# Patient Record
Sex: FEMALE | Race: WHITE | NOT HISPANIC OR LATINO | Employment: OTHER | URBAN - METROPOLITAN AREA
[De-identification: names, ages, dates, MRNs, and addresses within clinical notes are randomized per-mention and may not be internally consistent; named-entity substitution may affect disease eponyms.]

---

## 2024-07-16 ENCOUNTER — APPOINTMENT (OUTPATIENT)
Dept: RADIOLOGY | Facility: HOSPITAL | Age: 74
End: 2024-07-16
Payer: MEDICARE

## 2024-07-16 ENCOUNTER — HOSPITAL ENCOUNTER (OUTPATIENT)
Facility: HOSPITAL | Age: 74
Setting detail: OBSERVATION
Discharge: HOME | End: 2024-07-17
Attending: EMERGENCY MEDICINE | Admitting: STUDENT IN AN ORGANIZED HEALTH CARE EDUCATION/TRAINING PROGRAM
Payer: MEDICARE

## 2024-07-16 ENCOUNTER — APPOINTMENT (OUTPATIENT)
Dept: CARDIOLOGY | Facility: HOSPITAL | Age: 74
End: 2024-07-16
Payer: MEDICARE

## 2024-07-16 DIAGNOSIS — N13.30 HYDRONEPHROSIS, LEFT: ICD-10-CM

## 2024-07-16 DIAGNOSIS — N13.4 HYDROURETER ON LEFT: ICD-10-CM

## 2024-07-16 DIAGNOSIS — N13.30 HYDRONEPHROSIS, UNSPECIFIED HYDRONEPHROSIS TYPE: ICD-10-CM

## 2024-07-16 DIAGNOSIS — N30.01 ACUTE CYSTITIS WITH HEMATURIA: Primary | ICD-10-CM

## 2024-07-16 PROBLEM — R10.9 ABDOMINAL PAIN, UNSPECIFIED ABDOMINAL LOCATION: Status: ACTIVE | Noted: 2024-07-16

## 2024-07-16 PROBLEM — K86.2 PANCREATIC CYST (HHS-HCC): Status: ACTIVE | Noted: 2024-07-16

## 2024-07-16 PROBLEM — N13.39 OTHER HYDRONEPHROSIS: Status: ACTIVE | Noted: 2024-07-16

## 2024-07-16 LAB
ALBUMIN SERPL BCP-MCNC: 4 G/DL (ref 3.4–5)
ALP SERPL-CCNC: 48 U/L (ref 33–136)
ALT SERPL W P-5'-P-CCNC: 24 U/L (ref 7–45)
ANION GAP SERPL CALC-SCNC: 16 MMOL/L (ref 10–20)
APPEARANCE UR: CLEAR
AST SERPL W P-5'-P-CCNC: 29 U/L (ref 9–39)
ATRIAL RATE: 89 BPM
BACTERIA #/AREA URNS AUTO: ABNORMAL /HPF
BASOPHILS # BLD AUTO: 0.02 X10*3/UL (ref 0–0.1)
BASOPHILS NFR BLD AUTO: 0.2 %
BILIRUB SERPL-MCNC: 0.7 MG/DL (ref 0–1.2)
BILIRUB UR STRIP.AUTO-MCNC: NEGATIVE MG/DL
BUN SERPL-MCNC: 24 MG/DL (ref 6–23)
CALCIUM SERPL-MCNC: 8.9 MG/DL (ref 8.6–10.3)
CHLORIDE SERPL-SCNC: 101 MMOL/L (ref 98–107)
CO2 SERPL-SCNC: 23 MMOL/L (ref 21–32)
COLOR UR: ABNORMAL
CREAT SERPL-MCNC: 0.91 MG/DL (ref 0.5–1.05)
EGFRCR SERPLBLD CKD-EPI 2021: 67 ML/MIN/1.73M*2
EOSINOPHIL # BLD AUTO: 0 X10*3/UL (ref 0–0.4)
EOSINOPHIL NFR BLD AUTO: 0 %
ERYTHROCYTE [DISTWIDTH] IN BLOOD BY AUTOMATED COUNT: 12.1 % (ref 11.5–14.5)
FLUAV RNA RESP QL NAA+PROBE: NOT DETECTED
FLUBV RNA RESP QL NAA+PROBE: NOT DETECTED
GLUCOSE SERPL-MCNC: 156 MG/DL (ref 74–99)
GLUCOSE UR STRIP.AUTO-MCNC: NORMAL MG/DL
HCT VFR BLD AUTO: 37.8 % (ref 36–46)
HGB BLD-MCNC: 12.5 G/DL (ref 12–16)
HOLD SPECIMEN: NORMAL
HOLD SPECIMEN: NORMAL
IMM GRANULOCYTES # BLD AUTO: 0.03 X10*3/UL (ref 0–0.5)
IMM GRANULOCYTES NFR BLD AUTO: 0.3 % (ref 0–0.9)
KETONES UR STRIP.AUTO-MCNC: ABNORMAL MG/DL
LACTATE SERPL-SCNC: 1 MMOL/L (ref 0.4–2)
LEUKOCYTE ESTERASE UR QL STRIP.AUTO: ABNORMAL
LYMPHOCYTES # BLD AUTO: 0.19 X10*3/UL (ref 0.8–3)
LYMPHOCYTES NFR BLD AUTO: 2 %
MAGNESIUM SERPL-MCNC: 1.68 MG/DL (ref 1.6–2.4)
MCH RBC QN AUTO: 28.9 PG (ref 26–34)
MCHC RBC AUTO-ENTMCNC: 33.1 G/DL (ref 32–36)
MCV RBC AUTO: 88 FL (ref 80–100)
MONOCYTES # BLD AUTO: 0.63 X10*3/UL (ref 0.05–0.8)
MONOCYTES NFR BLD AUTO: 6.7 %
MUCOUS THREADS #/AREA URNS AUTO: ABNORMAL /LPF
NEUTROPHILS # BLD AUTO: 8.48 X10*3/UL (ref 1.6–5.5)
NEUTROPHILS NFR BLD AUTO: 90.8 %
NITRITE UR QL STRIP.AUTO: NEGATIVE
NRBC BLD-RTO: 0 /100 WBCS (ref 0–0)
P AXIS: 55 DEGREES
P OFFSET: 200 MS
P ONSET: 151 MS
PH UR STRIP.AUTO: 6.5 [PH]
PHOSPHATE SERPL-MCNC: 1.7 MG/DL (ref 2.5–4.9)
PLATELET # BLD AUTO: 122 X10*3/UL (ref 150–450)
POTASSIUM SERPL-SCNC: 3.5 MMOL/L (ref 3.5–5.3)
PR INTERVAL: 150 MS
PROT SERPL-MCNC: 6.7 G/DL (ref 6.4–8.2)
PROT UR STRIP.AUTO-MCNC: ABNORMAL MG/DL
Q ONSET: 226 MS
QRS COUNT: 15 BEATS
QRS DURATION: 84 MS
QT INTERVAL: 368 MS
QTC CALCULATION(BAZETT): 447 MS
QTC FREDERICIA: 419 MS
R AXIS: 41 DEGREES
RBC # BLD AUTO: 4.32 X10*6/UL (ref 4–5.2)
RBC # UR STRIP.AUTO: ABNORMAL /UL
RBC #/AREA URNS AUTO: >20 /HPF
SARS-COV-2 RNA RESP QL NAA+PROBE: NOT DETECTED
SODIUM SERPL-SCNC: 136 MMOL/L (ref 136–145)
SP GR UR STRIP.AUTO: 1.02
T AXIS: 2 DEGREES
T OFFSET: 410 MS
UROBILINOGEN UR STRIP.AUTO-MCNC: NORMAL MG/DL
VENTRICULAR RATE: 89 BPM
WBC # BLD AUTO: 9.4 X10*3/UL (ref 4.4–11.3)
WBC #/AREA URNS AUTO: ABNORMAL /HPF

## 2024-07-16 PROCEDURE — 2500000001 HC RX 250 WO HCPCS SELF ADMINISTERED DRUGS (ALT 637 FOR MEDICARE OP)

## 2024-07-16 PROCEDURE — 80053 COMPREHEN METABOLIC PANEL: CPT

## 2024-07-16 PROCEDURE — 36415 COLL VENOUS BLD VENIPUNCTURE: CPT

## 2024-07-16 PROCEDURE — 81001 URINALYSIS AUTO W/SCOPE: CPT

## 2024-07-16 PROCEDURE — 83735 ASSAY OF MAGNESIUM: CPT

## 2024-07-16 PROCEDURE — 96372 THER/PROPH/DIAG INJ SC/IM: CPT | Performed by: STUDENT IN AN ORGANIZED HEALTH CARE EDUCATION/TRAINING PROGRAM

## 2024-07-16 PROCEDURE — 84100 ASSAY OF PHOSPHORUS: CPT

## 2024-07-16 PROCEDURE — 96375 TX/PRO/DX INJ NEW DRUG ADDON: CPT

## 2024-07-16 PROCEDURE — 83605 ASSAY OF LACTIC ACID: CPT

## 2024-07-16 PROCEDURE — 2500000001 HC RX 250 WO HCPCS SELF ADMINISTERED DRUGS (ALT 637 FOR MEDICARE OP): Performed by: EMERGENCY MEDICINE

## 2024-07-16 PROCEDURE — 2500000004 HC RX 250 GENERAL PHARMACY W/ HCPCS (ALT 636 FOR OP/ED): Performed by: STUDENT IN AN ORGANIZED HEALTH CARE EDUCATION/TRAINING PROGRAM

## 2024-07-16 PROCEDURE — 2500000001 HC RX 250 WO HCPCS SELF ADMINISTERED DRUGS (ALT 637 FOR MEDICARE OP): Performed by: STUDENT IN AN ORGANIZED HEALTH CARE EDUCATION/TRAINING PROGRAM

## 2024-07-16 PROCEDURE — 99223 1ST HOSP IP/OBS HIGH 75: CPT | Performed by: STUDENT IN AN ORGANIZED HEALTH CARE EDUCATION/TRAINING PROGRAM

## 2024-07-16 PROCEDURE — 96361 HYDRATE IV INFUSION ADD-ON: CPT

## 2024-07-16 PROCEDURE — 85025 COMPLETE CBC W/AUTO DIFF WBC: CPT

## 2024-07-16 PROCEDURE — 74018 RADEX ABDOMEN 1 VIEW: CPT | Performed by: RADIOLOGY

## 2024-07-16 PROCEDURE — 87086 URINE CULTURE/COLONY COUNT: CPT | Mod: STJLAB

## 2024-07-16 PROCEDURE — 71045 X-RAY EXAM CHEST 1 VIEW: CPT | Performed by: RADIOLOGY

## 2024-07-16 PROCEDURE — 96365 THER/PROPH/DIAG IV INF INIT: CPT | Mod: 59

## 2024-07-16 PROCEDURE — 2500000004 HC RX 250 GENERAL PHARMACY W/ HCPCS (ALT 636 FOR OP/ED)

## 2024-07-16 PROCEDURE — 74018 RADEX ABDOMEN 1 VIEW: CPT

## 2024-07-16 PROCEDURE — G0378 HOSPITAL OBSERVATION PER HR: HCPCS

## 2024-07-16 PROCEDURE — 2550000001 HC RX 255 CONTRASTS: Performed by: EMERGENCY MEDICINE

## 2024-07-16 PROCEDURE — 99285 EMERGENCY DEPT VISIT HI MDM: CPT | Mod: 25

## 2024-07-16 PROCEDURE — 87636 SARSCOV2 & INF A&B AMP PRB: CPT

## 2024-07-16 PROCEDURE — 93005 ELECTROCARDIOGRAM TRACING: CPT

## 2024-07-16 PROCEDURE — 74177 CT ABD & PELVIS W/CONTRAST: CPT

## 2024-07-16 PROCEDURE — 71045 X-RAY EXAM CHEST 1 VIEW: CPT

## 2024-07-16 RX ORDER — ATORVASTATIN CALCIUM 20 MG/1
20 TABLET, FILM COATED ORAL DAILY
COMMUNITY

## 2024-07-16 RX ORDER — CEFTRIAXONE 1 G/50ML
1 INJECTION, SOLUTION INTRAVENOUS EVERY 24 HOURS
Status: DISCONTINUED | OUTPATIENT
Start: 2024-07-17 | End: 2024-07-17 | Stop reason: HOSPADM

## 2024-07-16 RX ORDER — CHOLECALCIFEROL (VITAMIN D3) 50 MCG
50 TABLET ORAL DAILY
COMMUNITY

## 2024-07-16 RX ORDER — ATORVASTATIN CALCIUM 20 MG/1
20 TABLET, FILM COATED ORAL DAILY
Status: DISCONTINUED | OUTPATIENT
Start: 2024-07-16 | End: 2024-07-17 | Stop reason: HOSPADM

## 2024-07-16 RX ORDER — ACETAMINOPHEN 325 MG/1
650 TABLET ORAL EVERY 4 HOURS PRN
Status: DISCONTINUED | OUTPATIENT
Start: 2024-07-16 | End: 2024-07-17 | Stop reason: HOSPADM

## 2024-07-16 RX ORDER — ACETAMINOPHEN 160 MG/5ML
650 SOLUTION ORAL EVERY 4 HOURS PRN
Status: DISCONTINUED | OUTPATIENT
Start: 2024-07-16 | End: 2024-07-17 | Stop reason: HOSPADM

## 2024-07-16 RX ORDER — BUTYROSPERMUM PARKII(SHEA BUTTER), SIMMONDSIA CHINENSIS (JOJOBA) SEED OIL, ALOE BARBADENSIS LEAF EXTRACT .01; 1; 3.5 G/100G; G/100G; G/100G
250 LIQUID TOPICAL DAILY
COMMUNITY

## 2024-07-16 RX ORDER — ACETAMINOPHEN 650 MG/1
650 SUPPOSITORY RECTAL EVERY 4 HOURS PRN
Status: DISCONTINUED | OUTPATIENT
Start: 2024-07-16 | End: 2024-07-17 | Stop reason: HOSPADM

## 2024-07-16 RX ORDER — POLYETHYLENE GLYCOL 3350 17 G/17G
17 POWDER, FOR SOLUTION ORAL DAILY
Status: DISCONTINUED | OUTPATIENT
Start: 2024-07-16 | End: 2024-07-17 | Stop reason: HOSPADM

## 2024-07-16 RX ORDER — ACETAMINOPHEN 325 MG/1
975 TABLET ORAL ONCE
Status: COMPLETED | OUTPATIENT
Start: 2024-07-16 | End: 2024-07-16

## 2024-07-16 RX ORDER — KETOROLAC TROMETHAMINE 15 MG/ML
15 INJECTION, SOLUTION INTRAMUSCULAR; INTRAVENOUS ONCE
Status: COMPLETED | OUTPATIENT
Start: 2024-07-16 | End: 2024-07-16

## 2024-07-16 RX ORDER — CEFTRIAXONE 1 G/50ML
1 INJECTION, SOLUTION INTRAVENOUS ONCE
Status: COMPLETED | OUTPATIENT
Start: 2024-07-16 | End: 2024-07-16

## 2024-07-16 RX ORDER — ONDANSETRON HYDROCHLORIDE 2 MG/ML
4 INJECTION, SOLUTION INTRAVENOUS ONCE
Status: COMPLETED | OUTPATIENT
Start: 2024-07-16 | End: 2024-07-16

## 2024-07-16 RX ORDER — ENOXAPARIN SODIUM 100 MG/ML
40 INJECTION SUBCUTANEOUS EVERY 24 HOURS
Status: DISCONTINUED | OUTPATIENT
Start: 2024-07-16 | End: 2024-07-17 | Stop reason: HOSPADM

## 2024-07-16 RX ORDER — PHENAZOPYRIDINE HYDROCHLORIDE 100 MG/1
100 TABLET, FILM COATED ORAL
Status: DISCONTINUED | OUTPATIENT
Start: 2024-07-16 | End: 2024-07-17 | Stop reason: HOSPADM

## 2024-07-16 RX ORDER — CALCIUM CARBONATE 300MG(750)
400 TABLET,CHEWABLE ORAL DAILY
COMMUNITY

## 2024-07-16 RX ORDER — ONDANSETRON HYDROCHLORIDE 2 MG/ML
INJECTION, SOLUTION INTRAVENOUS
Status: COMPLETED
Start: 2024-07-16 | End: 2024-07-16

## 2024-07-16 SDOH — SOCIAL STABILITY: SOCIAL INSECURITY: ARE THERE ANY APPARENT SIGNS OF INJURIES/BEHAVIORS THAT COULD BE RELATED TO ABUSE/NEGLECT?: NO

## 2024-07-16 SDOH — SOCIAL STABILITY: SOCIAL INSECURITY: HAVE YOU HAD THOUGHTS OF HARMING ANYONE ELSE?: NO

## 2024-07-16 SDOH — SOCIAL STABILITY: SOCIAL INSECURITY: WERE YOU ABLE TO COMPLETE ALL THE BEHAVIORAL HEALTH SCREENINGS?: YES

## 2024-07-16 SDOH — SOCIAL STABILITY: SOCIAL INSECURITY: DO YOU FEEL ANYONE HAS EXPLOITED OR TAKEN ADVANTAGE OF YOU FINANCIALLY OR OF YOUR PERSONAL PROPERTY?: NO

## 2024-07-16 SDOH — SOCIAL STABILITY: SOCIAL INSECURITY: HAS ANYONE EVER THREATENED TO HURT YOUR FAMILY OR YOUR PETS?: NO

## 2024-07-16 SDOH — SOCIAL STABILITY: SOCIAL INSECURITY: ABUSE: ADULT

## 2024-07-16 SDOH — SOCIAL STABILITY: SOCIAL INSECURITY: DO YOU FEEL UNSAFE GOING BACK TO THE PLACE WHERE YOU ARE LIVING?: NO

## 2024-07-16 SDOH — SOCIAL STABILITY: SOCIAL INSECURITY: DOES ANYONE TRY TO KEEP YOU FROM HAVING/CONTACTING OTHER FRIENDS OR DOING THINGS OUTSIDE YOUR HOME?: NO

## 2024-07-16 SDOH — SOCIAL STABILITY: SOCIAL INSECURITY: ARE YOU OR HAVE YOU BEEN THREATENED OR ABUSED PHYSICALLY, EMOTIONALLY, OR SEXUALLY BY ANYONE?: NO

## 2024-07-16 ASSESSMENT — LIFESTYLE VARIABLES
HOW MANY STANDARD DRINKS CONTAINING ALCOHOL DO YOU HAVE ON A TYPICAL DAY: PATIENT DOES NOT DRINK
AUDIT-C TOTAL SCORE: 0
HOW OFTEN DO YOU HAVE A DRINK CONTAINING ALCOHOL: NEVER
SKIP TO QUESTIONS 9-10: 1
AUDIT-C TOTAL SCORE: 0
HOW OFTEN DO YOU HAVE 6 OR MORE DRINKS ON ONE OCCASION: NEVER

## 2024-07-16 ASSESSMENT — PAIN SCALES - GENERAL
PAINLEVEL_OUTOF10: 4
PAINLEVEL_OUTOF10: 0 - NO PAIN
PAINLEVEL_OUTOF10: 4

## 2024-07-16 ASSESSMENT — PAIN - FUNCTIONAL ASSESSMENT: PAIN_FUNCTIONAL_ASSESSMENT: 0-10

## 2024-07-16 ASSESSMENT — ACTIVITIES OF DAILY LIVING (ADL)
FEEDING YOURSELF: INDEPENDENT
PATIENT'S MEMORY ADEQUATE TO SAFELY COMPLETE DAILY ACTIVITIES?: YES
WALKS IN HOME: INDEPENDENT
ADEQUATE_TO_COMPLETE_ADL: YES
JUDGMENT_ADEQUATE_SAFELY_COMPLETE_DAILY_ACTIVITIES: YES
HEARING - LEFT EAR: FUNCTIONAL
LACK_OF_TRANSPORTATION: NO
HEARING - RIGHT EAR: FUNCTIONAL
DRESSING YOURSELF: INDEPENDENT
TOILETING: INDEPENDENT
GROOMING: INDEPENDENT
BATHING: INDEPENDENT

## 2024-07-16 ASSESSMENT — COGNITIVE AND FUNCTIONAL STATUS - GENERAL
DAILY ACTIVITIY SCORE: 24
MOBILITY SCORE: 24
PATIENT BASELINE BEDBOUND: NO

## 2024-07-16 ASSESSMENT — PATIENT HEALTH QUESTIONNAIRE - PHQ9
1. LITTLE INTEREST OR PLEASURE IN DOING THINGS: NOT AT ALL
2. FEELING DOWN, DEPRESSED OR HOPELESS: NOT AT ALL
SUM OF ALL RESPONSES TO PHQ9 QUESTIONS 1 & 2: 0

## 2024-07-16 ASSESSMENT — PAIN DESCRIPTION - LOCATION
LOCATION: ABDOMEN
LOCATION: HEAD

## 2024-07-16 ASSESSMENT — COLUMBIA-SUICIDE SEVERITY RATING SCALE - C-SSRS
1. IN THE PAST MONTH, HAVE YOU WISHED YOU WERE DEAD OR WISHED YOU COULD GO TO SLEEP AND NOT WAKE UP?: NO
2. HAVE YOU ACTUALLY HAD ANY THOUGHTS OF KILLING YOURSELF?: NO
6. HAVE YOU EVER DONE ANYTHING, STARTED TO DO ANYTHING, OR PREPARED TO DO ANYTHING TO END YOUR LIFE?: NO

## 2024-07-16 NOTE — PROGRESS NOTES
Emergency Medicine Transition of Care Note.    I received Veronica Keith in signout from Dr. Britton.  Please see the previous ED provider note for all HPI, PE and MDM up to the time of signout at 0700. This is in addition to the primary record.    In brief Veronica Keith is an 73 y.o. female presenting for left lower quadrant abdominal pain, nausea, vomiting loose stools as well as fever and headache and chills.  Chief Complaint   Patient presents with    Abdominal Pain    Flu Symptoms     At the time of signout we were awaiting: Labs, x-rays, and CT imaging for dispo.  Diagnoses as of 07/16/24 1939   Acute cystitis with hematuria   Hydronephrosis, unspecified hydronephrosis type       Medical Decision Making    Patient was not given Tylenol by previous resident due nausea and vomiting which improved after 4 mg of Zofran so patient was able to tolerate p.o. Tylenol 975 mg.  Labs significant for 2+ bacteria with pyuria and hematuria as well as 75 LE.  Serum phosphate also low at 1.7 which was repleted with p.o K-Phos.  1 L bolus of sodium chloride ordered as well as 1 g of ceftriaxone for treatment of UTI.  Patient's temperature improved to 101.3 after Tylenol. No evidence of acute pathology on x-rays, however patient has mild right hydronephrosis and left hydroureteronephrosis without obvious obstruction.  Given the CT findings in the context of UTI on-call urologist Dr. Issa Boyd was consulted who advised admission to medicine with urology on consult for further evaluation and treatment of patient's UTI given bilateral hydronephrosis.  Patient case discussed with Dr. Morgan who agreed to meet patient to his service for further evaluation and treatment.    Final diagnoses:   [N30.01] Acute cystitis with hematuria   [N13.30] Hydronephrosis, unspecified hydronephrosis type           Procedure  Procedures    Batsheva Connolly DO     Reviewed and approved by BATSHEVA CONNOLLY on 7/16/24 at 7:39 PM.

## 2024-07-16 NOTE — DISCHARGE INSTRUCTIONS
UROLOGY  Please follow-up with Dr. Issa Boyd in 4-6 weeks in his office with a CT urogram prior. Call [phone] for an appointment.   The CT order has been placed in the  system.  Please call schedule.

## 2024-07-16 NOTE — H&P
History Of Present Illness  Veronica Keith is a 73 y.o. female presenting with nausea/vomiting/left lower abdominal pain.  Patient states since last Thursday, she has been developing gradually worsening left lower quadrant abdominal pain.  Pain is isolated close to the groin area, without any radiation.  Patient states the pain was tolerable mostly persistent.  Over the course of the past few days, she started developing nausea and vomiting starting Sunday.  Oral intake has been decreased due to the symptoms.  She also endorses fever and chills at home starting Sunday as well.  Due to the symptoms, she decided come to ED for further evaluation.  Denies any previous history of kidney stones, abdominal pain or abdominal surgeries.  Patient does state she has a family history of polycystic kidney disease, however she does not have diagnosis of the disease.    While in the emergency room, she had a initial temperature of 102.2, otherwise vital stable.  BMP showed potassium of 3.5, Phos 1.7, otherwise benign appearing.  Renal function within normal limits.  Lactate was 1.0.  CBC did not show any leukocytosis.  Flu and COVID were tested, both negative.  Urinalysis was done showing slightly elevated WBC and RBC, 2+ bacteria in the urine.  Nitrite was negative.  CT abdomen was done, showing hypoattenuation lesion of 1.3 x 1.1 cm within the tail of the pancreas concerning for pancreatic cyst recommend further characterization via MRI of the pancreas for more definitive workup.  Mild to moderate left hydronephrosis without an obstructing radiopaque ureteral stone, consider CT urogram and urology consult.  Case was discussed with urology, recommend to admit patient and place urology on consult.  Patient was given Rocephin in the emergency room for suspected UTI.  At the time of my evaluation, patient appeared comfortable not in any distress.      Patient is full code     Past Medical History  She has no past medical history on  "file.    Surgical History  She has a past surgical history that includes Tubal ligation (07/22/2013) and Foot surgery (07/22/2013).     Social History  She has no history on file for tobacco use, alcohol use, and drug use.    Family History  No family history on file.     Allergies  Demerol [meperidine] and Medrol [methylprednisolone]    Review of Systems   ROS: 12 systems reviewed and negative except per HPI above     Physical Exam by System:     Constitutional: Well developed, awake/alert/oriented x3, no distress, alert and cooperative   ENMT: mucous membranes moist   Head/Neck: Neck supple   Respiratory/Thorax: Patent airways, CTAB, normal breath sounds with good chest expansion, thorax symmetric   Cardiovascular: Regular, rate and rhythm, no murmurs, 2+ equal pulses of the extremities, normal S 1and S 2   Gastrointestinal: Nondistended, soft, non-tender, no rebound tenderness or guarding, no masses palpable, no organomegaly, +BS, no bruits   Musculoskeletal: ROM intact, no joint swelling, normal strength   Extremities: normal extremities, no cyanosis edema, contusions or wounds, no clubbing   Neurological: alert and oriented x3, intact senses, motor, response and reflexes, normal strength       Last Recorded Vitals  Blood pressure 111/56, pulse 72, temperature 36.5 °C (97.7 °F), temperature source Oral, resp. rate 16, height 1.664 m (5' 5.5\"), weight 69.9 kg (154 lb), SpO2 97%.    Relevant Results  Results for orders placed or performed during the hospital encounter of 07/16/24 (from the past 24 hour(s))   CBC and Auto Differential   Result Value Ref Range    WBC 9.4 4.4 - 11.3 x10*3/uL    nRBC 0.0 0.0 - 0.0 /100 WBCs    RBC 4.32 4.00 - 5.20 x10*6/uL    Hemoglobin 12.5 12.0 - 16.0 g/dL    Hematocrit 37.8 36.0 - 46.0 %    MCV 88 80 - 100 fL    MCH 28.9 26.0 - 34.0 pg    MCHC 33.1 32.0 - 36.0 g/dL    RDW 12.1 11.5 - 14.5 %    Platelets 122 (L) 150 - 450 x10*3/uL    Neutrophils % 90.8 40.0 - 80.0 %    Immature " Granulocytes %, Automated 0.3 0.0 - 0.9 %    Lymphocytes % 2.0 13.0 - 44.0 %    Monocytes % 6.7 2.0 - 10.0 %    Eosinophils % 0.0 0.0 - 6.0 %    Basophils % 0.2 0.0 - 2.0 %    Neutrophils Absolute 8.48 (H) 1.60 - 5.50 x10*3/uL    Immature Granulocytes Absolute, Automated 0.03 0.00 - 0.50 x10*3/uL    Lymphocytes Absolute 0.19 (L) 0.80 - 3.00 x10*3/uL    Monocytes Absolute 0.63 0.05 - 0.80 x10*3/uL    Eosinophils Absolute 0.00 0.00 - 0.40 x10*3/uL    Basophils Absolute 0.02 0.00 - 0.10 x10*3/uL   Phosphorus   Result Value Ref Range    Phosphorus 1.7 (L) 2.5 - 4.9 mg/dL   Magnesium   Result Value Ref Range    Magnesium 1.68 1.60 - 2.40 mg/dL   Comprehensive metabolic panel   Result Value Ref Range    Glucose 156 (H) 74 - 99 mg/dL    Sodium 136 136 - 145 mmol/L    Potassium 3.5 3.5 - 5.3 mmol/L    Chloride 101 98 - 107 mmol/L    Bicarbonate 23 21 - 32 mmol/L    Anion Gap 16 10 - 20 mmol/L    Urea Nitrogen 24 (H) 6 - 23 mg/dL    Creatinine 0.91 0.50 - 1.05 mg/dL    eGFR 67 >60 mL/min/1.73m*2    Calcium 8.9 8.6 - 10.3 mg/dL    Albumin 4.0 3.4 - 5.0 g/dL    Alkaline Phosphatase 48 33 - 136 U/L    Total Protein 6.7 6.4 - 8.2 g/dL    AST 29 9 - 39 U/L    Bilirubin, Total 0.7 0.0 - 1.2 mg/dL    ALT 24 7 - 45 U/L   Lactate   Result Value Ref Range    Lactate 1.0 0.4 - 2.0 mmol/L   Light Blue Top   Result Value Ref Range    Extra Tube Hold for add-ons.    Sars-CoV-2 PCR   Result Value Ref Range    Coronavirus 2019, PCR Not Detected Not Detected   Influenza A, and B PCR   Result Value Ref Range    Flu A Result Not Detected Not Detected    Flu B Result Not Detected Not Detected   ECG 12 lead   Result Value Ref Range    Ventricular Rate 89 BPM    Atrial Rate 89 BPM    SD Interval 150 ms    QRS Duration 84 ms    QT Interval 368 ms    QTC Calculation(Bazett) 447 ms    P Axis 55 degrees    R Axis 41 degrees    T Axis 2 degrees    QRS Count 15 beats    Q Onset 226 ms    P Onset 151 ms    P Offset 200 ms    T Offset 410 ms    QTC  Fredericia 419 ms   Urinalysis with Reflex Culture and Microscopic   Result Value Ref Range    Color, Urine Light-Yellow Light-Yellow, Yellow, Dark-Yellow    Appearance, Urine Clear Clear    Specific Gravity, Urine 1.019 1.005 - 1.035    pH, Urine 6.5 5.0, 5.5, 6.0, 6.5, 7.0, 7.5, 8.0    Protein, Urine 20 (TRACE) NEGATIVE, 10 (TRACE), 20 (TRACE) mg/dL    Glucose, Urine Normal Normal mg/dL    Blood, Urine 0.5 (2+) (A) NEGATIVE    Ketones, Urine 60 (2+) (A) NEGATIVE mg/dL    Bilirubin, Urine NEGATIVE NEGATIVE    Urobilinogen, Urine Normal Normal mg/dL    Nitrite, Urine NEGATIVE NEGATIVE    Leukocyte Esterase, Urine 75 Jesus/µL (A) NEGATIVE   Microscopic Only, Urine   Result Value Ref Range    WBC, Urine 11-20 (A) 1-5, NONE /HPF    RBC, Urine >20 (A) NONE, 1-2, 3-5 /HPF    Bacteria, Urine 2+ (A) NONE SEEN /HPF    Mucus, Urine FEW Reference range not established. /LPF      Scheduled medications  phenazopyridine, 100 mg, oral, TID  potassium phosphate (monobasic), 500 mg, oral, 4x daily      Continuous medications     PRN medications          Assessment/Plan   Principal Problem:    Abdominal pain, unspecified abdominal location  Active Problems:    Acute cystitis with hematuria    Other hydronephrosis    Pancreatic cyst ([facility])      Plan  - Etiology for abdominal pain unclear at this time, CT scan showing left-sided hydronephrosis without obstructing stone.  - Case discussed with urology, consult placed, may consider CT urogram for further evaluation  - UA suggestive of UTI, will continue Rocephin while waiting for culture  - CT finding of pancreatic cyst is likely incidental, unlikely related to patient's current presentation, likely the MRI can be done as outpatient  - Home medication resumed  - DVT prophylaxis with subcu Lovenox  - Patient is full code         I spent 65 minutes in the professional and overall care of this patient.    This dictation was created using voice recognition software.  If there are any  questions about inaccuracies please do not hesitate to contact me.      SIGNATURE: Jorge Morgan MD PATIENT NAME: Veronica Keith   DATE: July 16, 2024 MRN: [id number]   TIME: 3:01 PM

## 2024-07-16 NOTE — CONSULTS
Urology Consultation      Patient:  Veronica Keith  MRN: [id number]  YOB: 1950    CHIEF COMPLAINT:  Left abdominal pain    HISTORY OF PRESENT ILLNESS:   The patient is a 73 y.o. female admitted today after presenting to the emergency department with a 5-day history of worsening crampy and spasmic left lower quadrant abdominal pain accompanied by nausea and vomiting as well as fevers.  She denies any dysuria or hematuria.  No change in chronic urgency and frequency.  Urology is consulted for bilateral hydronephrosis in the setting of urinary tract infection.  Patient denies any past urologic history.  She denies flank pain.    Patient's old records, notes and chart reviewed and summarized above.    Past Medical History:    History reviewed. No pertinent past medical history.    Past Surgical History:    Past Surgical History:   Procedure Laterality Date    FOOT SURGERY  07/22/2013    Foot Surgery    TUBAL LIGATION  07/22/2013    Tubal Ligation       Medications:      Current Facility-Administered Medications:     acetaminophen (Tylenol) tablet 650 mg, 650 mg, oral, q4h PRN **OR** acetaminophen (Tylenol) oral liquid 650 mg, 650 mg, nasogastric tube, q4h PRN **OR** acetaminophen (Tylenol) suppository 650 mg, 650 mg, rectal, q4h PRN, Jorge Morgan MD    acetaminophen (Tylenol) tablet 650 mg, 650 mg, oral, q4h PRN **OR** acetaminophen (Tylenol) oral liquid 650 mg, 650 mg, oral, q4h PRN **OR** acetaminophen (Tylenol) suppository 650 mg, 650 mg, rectal, q4h PRN, Jorge Morgan MD    atorvastatin (Lipitor) tablet 20 mg, 20 mg, oral, Daily, Jorge Morgan MD    cefTRIAXone (Rocephin) 1 g in dextrose (iso) IV 50 mL, 1 g, intravenous, q24h, Jorge Morgan MD    enoxaparin (Lovenox) syringe 40 mg, 40 mg, subcutaneous, q24h, Jorge Morgan MD    phenazopyridine (Pyridium) tablet 100 mg, 100 mg, oral, TID, Jorge Morgan MD, 100 mg at 07/16/24 1604    polyethylene glycol (Glycolax, Miralax) packet 17 g, 17 g, oral, Daily, Jorge Morgan MD    potassium  phosphate (monobasic) (K-Phos) tablet 500 mg, 500 mg, oral, 4x daily, Rudolph LORA Irwin, DO, 500 mg at 07/16/24 0846    Allergies:    Allergies   Allergen Reactions    Demerol [Meperidine] Nausea Only, Headache and Nausea/vomiting    Medrol [Methylprednisolone] Other     flushing       Social History:   Social History     Socioeconomic History    Marital status:      Spouse name: Not on file    Number of children: Not on file    Years of education: Not on file    Highest education level: Not on file   Occupational History    Not on file   Tobacco Use    Smoking status: Never    Smokeless tobacco: Never   Substance and Sexual Activity    Alcohol use: Not on file    Drug use: Not on file    Sexual activity: Not on file   Other Topics Concern    Not on file   Social History Narrative    Not on file     Social Determinants of Health     Financial Resource Strain: Low Risk  (7/16/2024)    Overall Financial Resource Strain (CARDIA)     Difficulty of Paying Living Expenses: Not hard at all   Food Insecurity: Not on file   Transportation Needs: No Transportation Needs (7/16/2024)    PRAPARE - Transportation     Lack of Transportation (Medical): No     Lack of Transportation (Non-Medical): No   Physical Activity: Not on file   Stress: Not on file   Social Connections: Not on file   Intimate Partner Violence: Not on file   Housing Stability: Low Risk  (7/16/2024)    Housing Stability Vital Sign     Unable to Pay for Housing in the Last Year: No     Number of Times Moved in the Last Year: 1     Homeless in the Last Year: No       Family History:  No family history on file.    REVIEW OF SYSTEMS:  A comprehensive 14 point review of systems was obtained.  Constitutional: No fatigue  Eyes: No blurry vision  Ears, nose, mouth, throat, face: No ringing in the ears; no facial droop  Respiratory: No shortness of breath  Cardiovascular: No palpitations  Gastrointestinal: No diarrhea  Genitourinary: See HPI  Integument/Skin: No  rashes  Hematologic/Lymphatic: No easy bruising  Musculoskeletal: No muscle pains  Neurologic: No weakness in the extremities.   Psychiatric: No depression or suicidal thoughts.  Endocrine: No heat or cold intolerances.  Allergic/Immunologic: No current seasonal allergies; no skin hives.      Physical Exam:      This a 73 y.o. female   Vitals:    07/16/24 0834 07/16/24 1404 07/16/24 1601 07/16/24 1640   BP: (!) 106/49 111/56 132/61 116/56   BP Location: Right arm Left arm Left arm Left arm   Patient Position: Lying Sitting Sitting Sitting   Pulse: 87 72 81 75   Resp: 18 16 16 18   Temp: (!) 38.5 °C (101.3 °F) 36.5 °C (97.7 °F)  37.4 °C (99.3 °F)   TempSrc:  Oral  Temporal   SpO2: (!) 93% 97% 96% 98%   Weight:       Height:          Constitutional: Patient in no acute distress.  Neuro: alert and oriented to person place and time.   Head: Atraumatic and normocephalic.  Neck: Trachea midline.  Ext: 2+ radial pulses bilaterally.  Psych: Mood and affect normal.  Skin: No rashes or bruising present.  Lungs: Respiratory effort normal.  Cardiovascular:  Regular rate.  Abdomen: Soft, non-tender, non-distended.  CVA tenderness not present  Bladder non-tender and not distended.  No calf tenderness to palpation bilaterally.    Labs:  Results from last 7 days   Lab Units 07/16/24  0702   WBC AUTO x10*3/uL 9.4   HEMOGLOBIN g/dL 12.5   HEMATOCRIT % 37.8   PLATELETS AUTO x10*3/uL 122*      Results from last 7 days   Lab Units 07/16/24  0702   SODIUM mmol/L 136   POTASSIUM mmol/L 3.5   CHLORIDE mmol/L 101   CO2 mmol/L 23   BUN mg/dL 24*   CREATININE mg/dL 0.91   GLUCOSE mg/dL 156*   CALCIUM mg/dL 8.9          Lab Results   Component Value Date    APPEARANCEU Clear 07/16/2024    COLORU Light-Yellow 07/16/2024    KETONESU 60 (2+) (A) 07/16/2024    PROTUR 20 (TRACE) 07/16/2024    SPECGRAVU 1.019 07/16/2024    UROBILINOGEN Normal 07/16/2024    WBCU 11-20 (A) 07/16/2024        Micro:  Pending      -----------------------------------------------------------------  Imaging Results:  Images reviewed and report read.  Very mild right hydronephrosis.  Left hydroureteronephrosis down to a possible transition point in the left distal ureter.  CT abdomen pelvis w IV contrast    Result Date: 7/16/2024  STUDY: CT Abdomen and Pelvis with IV Contrast; 7/16/2024 at 8:12 AM. INDICATION: Left lower quadrant abdominal pain. COMPARISON: None available. ACCESSION NUMBER(S): [id number] ORDERING CLINICIAN: SARMAD DANIELLE TECHNIQUE: CT of the abdomen and pelvis was performed.  Contiguous axial images were obtained at 3 mm slice thickness through the abdomen and pelvis. Coronal and sagittal reconstructions at 3 mm slice thickness were performed.  Omnipaque 350 75 mL was administered intravenously.  FINDINGS: LOWER CHEST: No cardiomegaly.  No pericardial effusion.  Lung bases are clear.  ABDOMEN:  LIVER: No hepatomegaly.  Smooth surface contour.  Normal attenuation.  BILE DUCTS: No intrahepatic or extrahepatic biliary ductal dilatation.  GALLBLADDER: The gallbladder is normal. STOMACH: No abnormalities identified.  PANCREAS: No masses or ductal dilatation.  There is a hypoattenuating lesion of 1.3 x 1.1 cm within the tail of the pancreas concerning for a pancreatic cyst.  Series #201 slice 33.  SPLEEN: No splenomegaly or focal splenic lesion.  ADRENAL GLANDS: No thickening or nodules.  KIDNEYS AND URETERS: Kidneys are normal in size and location.  No renal or ureteral calculi.  There is a left lower pole cyst measuring 0.8 cm.  Mild to moderate left hydronephrosis without an obstructing radiopaque ureteral stone.  PELVIS:  BLADDER: No abnormalities identified.  REPRODUCTIVE ORGANS: Uterus is anteverted with low attenuation seen within the endometrial cavity.  Series #201 slice 124 and series #203 slice 54.   BOWEL: Scattered stool visualized throughout the colon.  No acute mechanical bowel obstruction.   There is diverticulosis coli.  No acute diverticulitis.   VESSELS: No abnormalities identified.  Abdominal aorta is normal in caliber.  PERITONEUM/RETROPERITONEUM/LYMPH NODES: No free fluid.  No pneumoperitoneum. No lymphadenopathy.  ABDOMINAL WALL: No abnormalities identified. SOFT TISSUES: No abnormalities identified.  BONES: No acute fracture or aggressive osseous lesion.  Mild narrowing of the intervertebral disc spaces involving L4-5.    1. There is a hypoattenuating lesion of 1.3 x 1.1 cm within the tail of the pancreas concerning for a pancreatic cyst. Further characterization via MRI of the pancreas post contrast is suggested. 2. Mild to moderate left hydronephrosis without an obstructing radiopaque ureteral stone.  Consider CT urogram and urology consult. 3. Diverticulosis coli without acute diverticulitis. 4. Low attenuation fluid seen within the endometrial cavity.  Consider pelvic sonogram.  Signed by Chris Ivy MD      KUB abdominal x-ray obtained approximately 8 hours after CT abdomen pelvis with IV contrast to rule out retained contrast.  Report still pending.  On personal review, no retained contrast in collecting system bilaterally.    Assessment and Plan   Impression:    73 y.o. female with bilateral hydronephrosis, urinary tract infection, fever      Plan:   Patient much improved after being admitted and receiving ceftriaxone.  Pain now completely resolved.  On personal review, very mild right hydronephrosis present.  More prominent left hydroureteronephrosis present down to a possible transition point in the distal ureter.  Etiology unclear but may be due to chronic reflux, recently passed stone, stricture, or possible neoplasm.  Will need further evaluation with CT urogram in approximately 4 weeks after acute infection has resolved.  If left hydronephrosis persists, may benefit from direct visualization.  Discussed with patient.  She will follow-up in my office after CT urogram  complete.  Follow-up urine culture and treat with 10 to 14-day course antibiotics.  No retained contrast in bilateral collecting system on KUB, therefore no high-grade obstruction present at this time.  No acute surgical intervention.    Issa Boyd MD  5:01 PM 7/16/2024

## 2024-07-16 NOTE — ED PROVIDER NOTES
HPI   Chief Complaint   Patient presents with    Abdominal Pain    Flu Symptoms       HPI       73-year-old female comes emergency department today via EMS for flulike symptoms. Patient endorses left lower quadrant abdominal pain since Thursday and flulike symptoms that developed on Sunday. Specifically nausea vomiting loose stools. The patient also endorses fever headache and chills. Paramedics indicated that in the patient had increased elevated blood pressure however she does not have a past medical history of high blood pressure.              Lester Coma Scale Score: 15                     Patient History   History reviewed. No pertinent past medical history.  Past Surgical History:   Procedure Laterality Date    FOOT SURGERY  07/22/2013    Foot Surgery    TUBAL LIGATION  07/22/2013    Tubal Ligation     No family history on file.  Social History     Tobacco Use    Smoking status: Unknown    Smokeless tobacco: Not on file   Substance Use Topics    Alcohol use: Not on file    Drug use: Not on file       Physical Exam   ED Triage Vitals [07/16/24 0653]   Temperature Heart Rate Respirations BP   (!) 39 °C (102.2 °F) 90 16 149/80      Pulse Ox Temp Source Heart Rate Source Patient Position   94 % Temporal Monitor Sitting      BP Location FiO2 (%)     -- --       Physical Exam  Constitutional:       Appearance: She is well-developed.   HENT:      Head: Normocephalic and atraumatic.      Right Ear: Tympanic membrane normal.      Left Ear: Tympanic membrane normal.      Nose: Nose normal.      Mouth/Throat:      Mouth: Mucous membranes are moist.      Pharynx: Oropharynx is clear.   Eyes:      Extraocular Movements: Extraocular movements intact.      Pupils: Pupils are equal, round, and reactive to light.   Cardiovascular:      Rate and Rhythm: Normal rate and regular rhythm.      Pulses: Normal pulses.      Heart sounds: Normal heart sounds.   Pulmonary:      Effort: Pulmonary effort is normal.      Breath sounds:  Normal breath sounds.   Abdominal:      General: Abdomen is flat.      Palpations: Abdomen is soft.      Tenderness: There is abdominal tenderness in the left lower quadrant.   Musculoskeletal:         General: Normal range of motion.      Cervical back: Normal range of motion and neck supple.   Skin:     General: Skin is warm.   Neurological:      General: No focal deficit present.      Mental Status: She is alert and oriented to person, place, and time.   Psychiatric:         Mood and Affect: Mood normal.         Behavior: Behavior normal.         ED Course & MDM   Diagnoses as of 07/16/24 2155   Acute cystitis with hematuria   Hydronephrosis, unspecified hydronephrosis type       Medical Decision Making  73-year-old female presents emergency department with chief complaints flulike symptoms.  Medical management treatment emergency department will consist of CBC, CMP, lactic acid, normal saline, chest x-ray, urinalysis, Zofran for nausea and vomiting.  Patient is febrile via paramedics we will administer Tylenol after Zofran.  The patient will be signed out to the day resident.         =================Attending note===============    The patient was seen by the resident/fellow.  I have personally performed a substantive portion of the encounter.  I have seen and examined the patient; agree with the workup, evaluation, MDM,   management and diagnosis.  The care plan has been discussed with the resident; I have reviewed the resident's note and agree with the documented findings.      This is a 73 y.o. female who presents to ER with LLQ pain that started Thursday, 5 days ago.  Patient has had low-grade fever with a temp of 100.9 at home.  Had mild headache.  Had nausea and vomiting.  States been vomiting about 20 times per day.  She had some soft stool without any diarrhea.  She had some increased urinary frequency.  States she been try to drink a lot of water.  No history of similar issues.  She does have a history  of hyperlipidemia.  She had a tube ligation and ovarian surgery.  No appendectomy or cholecystectomy.  She thinks she may have had a history of colitis in the past.  She states she recently had a negative Cologuard.  Heart is regular.  Lungs are clear.  Abdomen is soft.  There is some mild left lower quadrant tenderness.  No guarding or rebound pain or peritoneal signs.  No distress.    EKG: Normal sinus rhythm with a ventricular rate of 89.  HI interval 150.  QTc 447.  No ST changes.    COVID-negative.  Flu negative.  Phosphorus is low.  This will be replaced orally.  Chemistry is unremarkable except for mild elevated BUN and glucose.  CBC shows mildly low platelets and is otherwise normal.    CT:      1. There is a hypoattenuating lesion of 1.3 x 1.1 cm within the tail  of the pancreas concerning for a pancreatic cyst. Further  characterization via MRI of the pancreas post contrast is suggested.  2. Mild to moderate left hydronephrosis without an obstructing  radiopaque ureteral stone.  Consider CT urogram and urology consult.  3. Diverticulosis coli without acute diverticulitis.  4. Low attenuation fluid seen within the endometrial cavity.  Consider  pelvic sonogram.            The case and CT findings were discussed with urology.  They recommended admission and IV antibiotics.  Patient is admitted to the hospital for further treatment and evaluation.      ==========================================          Procedure  Procedures     Rudolph Irwin, DO  07/18/24 0029       Rudolph Irwin, DO  07/19/24 0729     Normal vision: sees adequately in most situations; can see medication labels, newsprint

## 2024-07-17 VITALS
TEMPERATURE: 97.9 F | SYSTOLIC BLOOD PRESSURE: 116 MMHG | DIASTOLIC BLOOD PRESSURE: 55 MMHG | BODY MASS INDEX: 24.75 KG/M2 | RESPIRATION RATE: 19 BRPM | HEART RATE: 69 BPM | OXYGEN SATURATION: 94 % | HEIGHT: 66 IN | WEIGHT: 154 LBS

## 2024-07-17 LAB
ANION GAP SERPL CALC-SCNC: 11 MMOL/L (ref 10–20)
BUN SERPL-MCNC: 24 MG/DL (ref 6–23)
CALCIUM SERPL-MCNC: 8.4 MG/DL (ref 8.6–10.3)
CHLORIDE SERPL-SCNC: 107 MMOL/L (ref 98–107)
CO2 SERPL-SCNC: 26 MMOL/L (ref 21–32)
CREAT SERPL-MCNC: 0.82 MG/DL (ref 0.5–1.05)
EGFRCR SERPLBLD CKD-EPI 2021: 76 ML/MIN/1.73M*2
ERYTHROCYTE [DISTWIDTH] IN BLOOD BY AUTOMATED COUNT: 12.5 % (ref 11.5–14.5)
GLUCOSE SERPL-MCNC: 93 MG/DL (ref 74–99)
HCT VFR BLD AUTO: 38.4 % (ref 36–46)
HGB BLD-MCNC: 12 G/DL (ref 12–16)
MAGNESIUM SERPL-MCNC: 1.93 MG/DL (ref 1.6–2.4)
MCH RBC QN AUTO: 27.9 PG (ref 26–34)
MCHC RBC AUTO-ENTMCNC: 31.3 G/DL (ref 32–36)
MCV RBC AUTO: 89 FL (ref 80–100)
NRBC BLD-RTO: 0 /100 WBCS (ref 0–0)
PLATELET # BLD AUTO: 105 X10*3/UL (ref 150–450)
POTASSIUM SERPL-SCNC: 3.8 MMOL/L (ref 3.5–5.3)
RBC # BLD AUTO: 4.3 X10*6/UL (ref 4–5.2)
SODIUM SERPL-SCNC: 140 MMOL/L (ref 136–145)
WBC # BLD AUTO: 6.3 X10*3/UL (ref 4.4–11.3)

## 2024-07-17 PROCEDURE — 2500000004 HC RX 250 GENERAL PHARMACY W/ HCPCS (ALT 636 FOR OP/ED): Performed by: STUDENT IN AN ORGANIZED HEALTH CARE EDUCATION/TRAINING PROGRAM

## 2024-07-17 PROCEDURE — 80048 BASIC METABOLIC PNL TOTAL CA: CPT | Performed by: STUDENT IN AN ORGANIZED HEALTH CARE EDUCATION/TRAINING PROGRAM

## 2024-07-17 PROCEDURE — 36415 COLL VENOUS BLD VENIPUNCTURE: CPT | Performed by: STUDENT IN AN ORGANIZED HEALTH CARE EDUCATION/TRAINING PROGRAM

## 2024-07-17 PROCEDURE — G0378 HOSPITAL OBSERVATION PER HR: HCPCS

## 2024-07-17 PROCEDURE — 85027 COMPLETE CBC AUTOMATED: CPT | Performed by: STUDENT IN AN ORGANIZED HEALTH CARE EDUCATION/TRAINING PROGRAM

## 2024-07-17 PROCEDURE — 96361 HYDRATE IV INFUSION ADD-ON: CPT

## 2024-07-17 PROCEDURE — 83735 ASSAY OF MAGNESIUM: CPT | Performed by: STUDENT IN AN ORGANIZED HEALTH CARE EDUCATION/TRAINING PROGRAM

## 2024-07-17 PROCEDURE — 2500000001 HC RX 250 WO HCPCS SELF ADMINISTERED DRUGS (ALT 637 FOR MEDICARE OP): Performed by: STUDENT IN AN ORGANIZED HEALTH CARE EDUCATION/TRAINING PROGRAM

## 2024-07-17 PROCEDURE — 99239 HOSP IP/OBS DSCHRG MGMT >30: CPT | Performed by: STUDENT IN AN ORGANIZED HEALTH CARE EDUCATION/TRAINING PROGRAM

## 2024-07-17 RX ORDER — PHENAZOPYRIDINE HYDROCHLORIDE 100 MG/1
100 TABLET, FILM COATED ORAL
Qty: 9 TABLET | Refills: 0 | Status: SHIPPED | OUTPATIENT
Start: 2024-07-17 | End: 2024-07-17

## 2024-07-17 RX ORDER — LEVOFLOXACIN 250 MG/1
750 TABLET ORAL DAILY
Qty: 30 TABLET | Refills: 0 | Status: SHIPPED | OUTPATIENT
Start: 2024-07-17 | End: 2024-07-17

## 2024-07-17 RX ORDER — PHENAZOPYRIDINE HYDROCHLORIDE 100 MG/1
100 TABLET, FILM COATED ORAL
Qty: 9 TABLET | Refills: 0 | Status: SHIPPED | OUTPATIENT
Start: 2024-07-17 | End: 2024-07-20

## 2024-07-17 ASSESSMENT — COGNITIVE AND FUNCTIONAL STATUS - GENERAL
MOBILITY SCORE: 24
DAILY ACTIVITIY SCORE: 24

## 2024-07-17 ASSESSMENT — PAIN SCALES - GENERAL
PAINLEVEL_OUTOF10: 4
PAINLEVEL_OUTOF10: 0 - NO PAIN

## 2024-07-17 ASSESSMENT — PAIN DESCRIPTION - LOCATION: LOCATION: HEAD

## 2024-07-17 NOTE — CARE PLAN
Problem: Pain - Adult  Goal: Verbalizes/displays adequate comfort level or baseline comfort level  Outcome: Progressing     Problem: Safety - Adult  Goal: Free from fall injury  Outcome: Progressing     Problem: Discharge Planning  Goal: Discharge to home or other facility with appropriate resources  Outcome: Progressing     Problem: Chronic Conditions and Co-morbidities  Goal: Patient's chronic conditions and co-morbidity symptoms are monitored and maintained or improved  Outcome: Progressing     Problem: Infective UTI/pyelonephritis  Goal: No worsening signs of infection  Outcome: Progressing   The patient's goals for the shift include      The clinical goals for the shift include Pt will remain free from abdominal pain

## 2024-07-17 NOTE — DISCHARGE SUMMARY
Discharge Diagnosis  Abdominal pain, unspecified abdominal location    Issues Requiring Follow-Up  Obtain CT urogram in 4 weeks, then follow up with urology to discuss results    Discharge Meds     Your medication list        START taking these medications        Instructions Last Dose Given Next Dose Due   levoFLOXacin 250 mg tablet  Commonly known as: Levaquin      Take 3 tablets (750 mg) by mouth once daily for 10 days.       phenazopyridine 100 mg tablet  Commonly known as: Pyridium      Take 1 tablet (100 mg) by mouth 3 times daily (morning, midday, late afternoon) for 3 days.              CONTINUE taking these medications        Instructions Last Dose Given Next Dose Due   atorvastatin 20 mg tablet  Commonly known as: Lipitor           CALCIUM 600 ORAL           cholecalciferol 50 MCG (2000 UT) tablet  Commonly known as: Vitamin D-3           magnesium oxide 400 mg tablet  Commonly known as: Mag-Ox           saccharomyces boulardii 250 mg capsule  Commonly known as: Florastor                     Where to Get Your Medications        These medications were sent to [id number] - [place] [id number] [address]      Phone: [phone]   levoFLOXacin 250 mg tablet  phenazopyridine 100 mg tablet         Test Results Pending At Discharge  Pending Labs       No current pending labs.            Hospital Course  Veronica Keith is a 73 y.o. female presenting with nausea/vomiting/left lower abdominal pain.  Patient states since last Thursday, she has been developing gradually worsening left lower quadrant abdominal pain.  Pain is isolated close to the groin area, without any radiation.  Patient states the pain was tolerable mostly persistent.  Over the course of the past few days, she started developing nausea and vomiting starting Sunday.  Oral intake has been decreased due to the symptoms.  She also endorses fever and chills at home starting Sunday as  well.  Due to the symptoms, she decided come to ED for further evaluation.  Denies any previous history of kidney stones, abdominal pain or abdominal surgeries.  Patient does state she has a family history of polycystic kidney disease, however she does not have diagnosis of the disease.     While in the emergency room, she had a initial temperature of 102.2, otherwise vital stable.  BMP showed potassium of 3.5, Phos 1.7, otherwise benign appearing.  Renal function within normal limits.  Lactate was 1.0.  CBC did not show any leukocytosis.  Flu and COVID were tested, both negative.  Urinalysis was done showing slightly elevated WBC and RBC, 2+ bacteria in the urine.  Nitrite was negative.  CT abdomen was done, showing hypoattenuation lesion of 1.3 x 1.1 cm within the tail of the pancreas concerning for pancreatic cyst recommend further characterization via MRI of the pancreas for more definitive workup.  Mild to moderate left hydronephrosis without an obstructing radiopaque ureteral stone, consider CT urogram and urology consult.  Case was discussed with urology, recommend to admit patient and place urology on consult.  Patient was given Rocephin in the emergency room for suspected UTI.  At the time of my evaluation, patient appeared comfortable not in any distress.    Hospital course: Unremarkable. Symptoms resolved shortly after starting CTX, and was seen by urology. They recommended CT urogram in 4 weeks after acute infection resolves and possible cystoscopy/ureteroscopy if hydronephrosis persists. Pt was discharged on levaquin 750mg PO daily for 10 days for Enteric bacilli > 100k cfu, however on 7/19 she called the ED stating she was unable to tolerate the medication any further due to severe gastritis symptoms. By this time, culture grew sensitive E. Faecalis and pt was changed to augmentin 875mg BID.    Pertinent Physical Exam At Time of Discharge  Physical Exam  Vitals reviewed.   Constitutional:        General: She is not in acute distress.     Appearance: Normal appearance.   HENT:      Head: Normocephalic and atraumatic.      Right Ear: External ear normal.      Left Ear: External ear normal.      Nose: Nose normal.      Mouth/Throat:      Mouth: Mucous membranes are moist.      Pharynx: Oropharynx is clear.   Eyes:      Extraocular Movements: Extraocular movements intact.   Cardiovascular:      Rate and Rhythm: Normal rate and regular rhythm.   Pulmonary:      Effort: Pulmonary effort is normal. No respiratory distress.   Abdominal:      General: There is no distension.      Palpations: Abdomen is soft.      Tenderness: There is no abdominal tenderness.   Musculoskeletal:         General: No signs of injury. Normal range of motion.      Cervical back: Normal range of motion.   Skin:     General: Skin is warm and dry.   Neurological:      General: No focal deficit present.      Mental Status: She is alert and oriented to person, place, and time. Mental status is at baseline.   Psychiatric:         Mood and Affect: Mood normal.         Behavior: Behavior normal.         Outpatient Follow-Up  Future Appointments   Date Time Provider Department Center   8/16/2024  1:45 PM STJ CT 1 [facility] I was the attending physician for this patient. Coordination of care and discharge process provided by me on day of discharge was > 30 minutes.    Rudolph Renae DO  /[facility]

## 2024-07-17 NOTE — PROGRESS NOTES
07/17/24 1030   Discharge Planning   Living Arrangements Spouse/significant other   Support Systems Spouse/significant other   Assistance Needed None   Type of Residence Private residence   Home or Post Acute Services None   Expected Discharge Disposition Home   Does the patient need discharge transport arranged? No   Financial Resource Strain   How hard is it for you to pay for the very basics like food, housing, medical care, and heating? Not hard     Spoke with pt regarding her dc plan. Pt lives at home with her . Splits residence between [place] and [place] (4 months per year). Generally utilizes the ER or Urgent Care when in the [facility]. Her PCP is in [place], already has a urology follow up scheduled for here in [place]. States she is independent and manages her own health. No difficulty with obtaining prescriptions or affording her living expenses such as utilities/groceries. Fills prescriptions at [facility]. Has a ride home upon dc and hopes to go home today.

## 2024-07-18 LAB — BACTERIA UR CULT: ABNORMAL

## 2024-07-19 RX ORDER — AMOXICILLIN AND CLAVULANATE POTASSIUM 875; 125 MG/1; MG/1
1 TABLET, FILM COATED ORAL 2 TIMES DAILY
Qty: 18 TABLET | Refills: 0 | Status: SHIPPED | OUTPATIENT
Start: 2024-07-19 | End: 2024-07-28

## 2024-07-19 NOTE — HOSPITAL COURSE
Veronica Keith is a 73 y.o. female presenting with nausea/vomiting/left lower abdominal pain.  Patient states since last Thursday, she has been developing gradually worsening left lower quadrant abdominal pain.  Pain is isolated close to the groin area, without any radiation.  Patient states the pain was tolerable mostly persistent.  Over the course of the past few days, she started developing nausea and vomiting starting Sunday.  Oral intake has been decreased due to the symptoms.  She also endorses fever and chills at home starting Sunday as well.  Due to the symptoms, she decided come to ED for further evaluation.  Denies any previous history of kidney stones, abdominal pain or abdominal surgeries.  Patient does state she has a family history of polycystic kidney disease, however she does not have diagnosis of the disease.     While in the emergency room, she had a initial temperature of 102.2, otherwise vital stable.  BMP showed potassium of 3.5, Phos 1.7, otherwise benign appearing.  Renal function within normal limits.  Lactate was 1.0.  CBC did not show any leukocytosis.  Flu and COVID were tested, both negative.  Urinalysis was done showing slightly elevated WBC and RBC, 2+ bacteria in the urine.  Nitrite was negative.  CT abdomen was done, showing hypoattenuation lesion of 1.3 x 1.1 cm within the tail of the pancreas concerning for pancreatic cyst recommend further characterization via MRI of the pancreas for more definitive workup.  Mild to moderate left hydronephrosis without an obstructing radiopaque ureteral stone, consider CT urogram and urology consult.  Case was discussed with urology, recommend to admit patient and place urology on consult.  Patient was given Rocephin in the emergency room for suspected UTI.  At the time of my evaluation, patient appeared comfortable not in any distress.    Hospital course: Unremarkable. Symptoms resolved shortly after starting CTX, and was seen by urology. They  recommended CT urogram in 4 weeks after acute infection resolves and possible cystoscopy/ureteroscopy if hydronephrosis persists. Pt was discharged on levaquin 750mg PO daily for 10 days for Enteric bacilli > 100k cfu, however on 7/19 she called the ED stating she was unable to tolerate the medication any further due to severe gastritis symptoms. By this time, culture grew sensitive E. Faecalis and pt was changed to augmentin 875mg BID.

## 2024-08-02 LAB
ATRIAL RATE: 89 BPM
P AXIS: 55 DEGREES
P OFFSET: 200 MS
P ONSET: 151 MS
PR INTERVAL: 150 MS
Q ONSET: 226 MS
QRS COUNT: 15 BEATS
QRS DURATION: 84 MS
QT INTERVAL: 368 MS
QTC CALCULATION(BAZETT): 447 MS
QTC FREDERICIA: 419 MS
R AXIS: 41 DEGREES
T AXIS: 2 DEGREES
T OFFSET: 410 MS
VENTRICULAR RATE: 89 BPM

## 2024-08-16 ENCOUNTER — APPOINTMENT (OUTPATIENT)
Dept: RADIOLOGY | Facility: HOSPITAL | Age: 74
End: 2024-08-16
Payer: MEDICARE

## 2024-08-20 ENCOUNTER — APPOINTMENT (OUTPATIENT)
Dept: RADIOLOGY | Facility: HOSPITAL | Age: 74
End: 2024-08-20
Payer: MEDICARE

## 2024-08-27 ENCOUNTER — APPOINTMENT (OUTPATIENT)
Dept: CARDIOLOGY | Facility: HOSPITAL | Age: 74
End: 2024-08-27
Payer: MEDICARE

## 2024-08-27 ENCOUNTER — HOSPITAL ENCOUNTER (EMERGENCY)
Facility: HOSPITAL | Age: 74
Discharge: HOME | End: 2024-08-27
Attending: EMERGENCY MEDICINE
Payer: MEDICARE

## 2024-08-27 ENCOUNTER — APPOINTMENT (OUTPATIENT)
Dept: RADIOLOGY | Facility: HOSPITAL | Age: 74
End: 2024-08-27
Payer: MEDICARE

## 2024-08-27 VITALS
BODY MASS INDEX: 24.49 KG/M2 | OXYGEN SATURATION: 98 % | WEIGHT: 147 LBS | HEART RATE: 78 BPM | TEMPERATURE: 97.5 F | DIASTOLIC BLOOD PRESSURE: 70 MMHG | HEIGHT: 65 IN | RESPIRATION RATE: 15 BRPM | SYSTOLIC BLOOD PRESSURE: 158 MMHG

## 2024-08-27 DIAGNOSIS — R11.0 NAUSEA: Primary | ICD-10-CM

## 2024-08-27 LAB
ALBUMIN SERPL BCP-MCNC: 4 G/DL (ref 3.4–5)
ALP SERPL-CCNC: 52 U/L (ref 33–136)
ALT SERPL W P-5'-P-CCNC: 16 U/L (ref 7–45)
ANION GAP SERPL CALC-SCNC: 14 MMOL/L (ref 10–20)
APPEARANCE UR: ABNORMAL
AST SERPL W P-5'-P-CCNC: 20 U/L (ref 9–39)
ATRIAL RATE: 70 BPM
BASOPHILS # BLD AUTO: 0.02 X10*3/UL (ref 0–0.1)
BASOPHILS NFR BLD AUTO: 0.2 %
BILIRUB SERPL-MCNC: 0.8 MG/DL (ref 0–1.2)
BILIRUB UR STRIP.AUTO-MCNC: NEGATIVE MG/DL
BUN SERPL-MCNC: 31 MG/DL (ref 6–23)
CALCIUM SERPL-MCNC: 9.8 MG/DL (ref 8.6–10.3)
CARDIAC TROPONIN I PNL SERPL HS: 16 NG/L (ref 0–13)
CARDIAC TROPONIN I PNL SERPL HS: 20 NG/L (ref 0–13)
CHLORIDE SERPL-SCNC: 99 MMOL/L (ref 98–107)
CO2 SERPL-SCNC: 29 MMOL/L (ref 21–32)
COLOR UR: ABNORMAL
CREAT SERPL-MCNC: 1.36 MG/DL (ref 0.5–1.05)
EGFRCR SERPLBLD CKD-EPI 2021: 41 ML/MIN/1.73M*2
EOSINOPHIL # BLD AUTO: 0.02 X10*3/UL (ref 0–0.4)
EOSINOPHIL NFR BLD AUTO: 0.2 %
ERYTHROCYTE [DISTWIDTH] IN BLOOD BY AUTOMATED COUNT: 12.5 % (ref 11.5–14.5)
GLUCOSE SERPL-MCNC: 102 MG/DL (ref 74–99)
GLUCOSE UR STRIP.AUTO-MCNC: NORMAL MG/DL
HCT VFR BLD AUTO: 38 % (ref 36–46)
HGB BLD-MCNC: 12.2 G/DL (ref 12–16)
HOLD SPECIMEN: NORMAL
IMM GRANULOCYTES # BLD AUTO: 0.08 X10*3/UL (ref 0–0.5)
IMM GRANULOCYTES NFR BLD AUTO: 0.6 % (ref 0–0.9)
KETONES UR STRIP.AUTO-MCNC: NEGATIVE MG/DL
LEUKOCYTE ESTERASE UR QL STRIP.AUTO: ABNORMAL
LIPASE SERPL-CCNC: 43 U/L (ref 9–82)
LYMPHOCYTES # BLD AUTO: 0.87 X10*3/UL (ref 0.8–3)
LYMPHOCYTES NFR BLD AUTO: 6.9 %
MCH RBC QN AUTO: 28.4 PG (ref 26–34)
MCHC RBC AUTO-ENTMCNC: 32.1 G/DL (ref 32–36)
MCV RBC AUTO: 89 FL (ref 80–100)
MONOCYTES # BLD AUTO: 0.98 X10*3/UL (ref 0.05–0.8)
MONOCYTES NFR BLD AUTO: 7.8 %
MUCOUS THREADS #/AREA URNS AUTO: ABNORMAL /LPF
NEUTROPHILS # BLD AUTO: 10.64 X10*3/UL (ref 1.6–5.5)
NEUTROPHILS NFR BLD AUTO: 84.3 %
NITRITE UR QL STRIP.AUTO: NEGATIVE
NRBC BLD-RTO: 0 /100 WBCS (ref 0–0)
P AXIS: 78 DEGREES
P OFFSET: 204 MS
P ONSET: 149 MS
PH UR STRIP.AUTO: 6.5 [PH]
PLATELET # BLD AUTO: 261 X10*3/UL (ref 150–450)
POTASSIUM SERPL-SCNC: 4.5 MMOL/L (ref 3.5–5.3)
PR INTERVAL: 148 MS
PROT SERPL-MCNC: 7.7 G/DL (ref 6.4–8.2)
PROT UR STRIP.AUTO-MCNC: ABNORMAL MG/DL
Q ONSET: 223 MS
QRS COUNT: 11 BEATS
QRS DURATION: 68 MS
QT INTERVAL: 378 MS
QTC CALCULATION(BAZETT): 408 MS
QTC FREDERICIA: 398 MS
R AXIS: 75 DEGREES
RBC # BLD AUTO: 4.29 X10*6/UL (ref 4–5.2)
RBC # UR STRIP.AUTO: ABNORMAL /UL
RBC #/AREA URNS AUTO: >20 /HPF
SODIUM SERPL-SCNC: 137 MMOL/L (ref 136–145)
SP GR UR STRIP.AUTO: 1.02
SQUAMOUS #/AREA URNS AUTO: ABNORMAL /HPF
T AXIS: 75 DEGREES
T OFFSET: 412 MS
UROBILINOGEN UR STRIP.AUTO-MCNC: NORMAL MG/DL
VENTRICULAR RATE: 70 BPM
WBC # BLD AUTO: 12.6 X10*3/UL (ref 4.4–11.3)
WBC #/AREA URNS AUTO: >50 /HPF
WBC CLUMPS #/AREA URNS AUTO: ABNORMAL /HPF

## 2024-08-27 PROCEDURE — 80053 COMPREHEN METABOLIC PANEL: CPT

## 2024-08-27 PROCEDURE — 96374 THER/PROPH/DIAG INJ IV PUSH: CPT

## 2024-08-27 PROCEDURE — 36415 COLL VENOUS BLD VENIPUNCTURE: CPT

## 2024-08-27 PROCEDURE — 2500000004 HC RX 250 GENERAL PHARMACY W/ HCPCS (ALT 636 FOR OP/ED)

## 2024-08-27 PROCEDURE — 83690 ASSAY OF LIPASE: CPT

## 2024-08-27 PROCEDURE — 84484 ASSAY OF TROPONIN QUANT: CPT

## 2024-08-27 PROCEDURE — 81003 URINALYSIS AUTO W/O SCOPE: CPT

## 2024-08-27 PROCEDURE — 93005 ELECTROCARDIOGRAM TRACING: CPT

## 2024-08-27 PROCEDURE — 96361 HYDRATE IV INFUSION ADD-ON: CPT

## 2024-08-27 PROCEDURE — 71045 X-RAY EXAM CHEST 1 VIEW: CPT | Performed by: RADIOLOGY

## 2024-08-27 PROCEDURE — 99285 EMERGENCY DEPT VISIT HI MDM: CPT

## 2024-08-27 PROCEDURE — 71045 X-RAY EXAM CHEST 1 VIEW: CPT

## 2024-08-27 PROCEDURE — 87086 URINE CULTURE/COLONY COUNT: CPT | Mod: STJLAB

## 2024-08-27 PROCEDURE — 85025 COMPLETE CBC W/AUTO DIFF WBC: CPT

## 2024-08-27 RX ORDER — FAMOTIDINE 20 MG/1
20 TABLET, FILM COATED ORAL 2 TIMES DAILY
Qty: 30 TABLET | Refills: 0 | Status: SHIPPED | OUTPATIENT
Start: 2024-08-27 | End: 2024-09-11

## 2024-08-27 RX ORDER — ONDANSETRON 4 MG/1
4 TABLET, FILM COATED ORAL EVERY 6 HOURS
Qty: 12 TABLET | Refills: 0 | Status: SHIPPED | OUTPATIENT
Start: 2024-08-27 | End: 2024-08-30

## 2024-08-27 RX ORDER — ONDANSETRON HYDROCHLORIDE 2 MG/ML
4 INJECTION, SOLUTION INTRAVENOUS ONCE
Status: COMPLETED | OUTPATIENT
Start: 2024-08-27 | End: 2024-08-27

## 2024-08-27 RX ORDER — FAMOTIDINE 20 MG/1
20 TABLET, FILM COATED ORAL 2 TIMES DAILY
Qty: 30 TABLET | Refills: 0 | Status: SHIPPED | OUTPATIENT
Start: 2024-08-27 | End: 2024-08-27

## 2024-08-27 RX ORDER — SUCRALFATE 1 G/10ML
1 SUSPENSION ORAL 4 TIMES DAILY
Qty: 1200 ML | Refills: 0 | Status: SHIPPED | OUTPATIENT
Start: 2024-08-27 | End: 2024-09-26

## 2024-08-27 RX ORDER — ONDANSETRON 4 MG/1
4 TABLET, FILM COATED ORAL EVERY 6 HOURS
Qty: 12 TABLET | Refills: 0 | Status: SHIPPED | OUTPATIENT
Start: 2024-08-27 | End: 2024-08-27

## 2024-08-27 RX ORDER — SUCRALFATE 1 G/10ML
1 SUSPENSION ORAL 4 TIMES DAILY
Qty: 1200 ML | Refills: 0 | Status: SHIPPED | OUTPATIENT
Start: 2024-08-27 | End: 2024-08-27

## 2024-08-27 ASSESSMENT — LIFESTYLE VARIABLES
TOTAL SCORE: 0
EVER FELT BAD OR GUILTY ABOUT YOUR DRINKING: NO
HAVE PEOPLE ANNOYED YOU BY CRITICIZING YOUR DRINKING: NO
EVER HAD A DRINK FIRST THING IN THE MORNING TO STEADY YOUR NERVES TO GET RID OF A HANGOVER: NO
HAVE YOU EVER FELT YOU SHOULD CUT DOWN ON YOUR DRINKING: NO

## 2024-08-27 ASSESSMENT — PAIN SCALES - GENERAL: PAINLEVEL_OUTOF10: 0 - NO PAIN

## 2024-08-27 ASSESSMENT — PAIN - FUNCTIONAL ASSESSMENT: PAIN_FUNCTIONAL_ASSESSMENT: 0-10

## 2024-08-27 ASSESSMENT — COLUMBIA-SUICIDE SEVERITY RATING SCALE - C-SSRS
2. HAVE YOU ACTUALLY HAD ANY THOUGHTS OF KILLING YOURSELF?: NO
1. IN THE PAST MONTH, HAVE YOU WISHED YOU WERE DEAD OR WISHED YOU COULD GO TO SLEEP AND NOT WAKE UP?: NO
6. HAVE YOU EVER DONE ANYTHING, STARTED TO DO ANYTHING, OR PREPARED TO DO ANYTHING TO END YOUR LIFE?: NO

## 2024-08-27 NOTE — DISCHARGE INSTRUCTIONS
Seek immediate medical attention if you develop:  abdominal pain, worsening nausea, new or worsening vomiting, new or worsening diarrhea, chest pain, shortness of breath, pain with urination, problems urinating, fever, chills, weakness, or any new or worsening symptoms.

## 2024-08-27 NOTE — ED PROVIDER NOTES
EMERGENCY DEPARTMENT ENCOUNTER      Pt Name: Veronica Kieth  MRN: [id number]  Birthdate 1950  Date of evaluation: 8/27/2024  Provider: Danny Plata DO    CHIEF COMPLAINT       Chief Complaint   Patient presents with    Nausea     Pt had covid 2 weeks ago. Since then, pt has been experiencing nausea. Denies vomiting. Pt states she has been sweating at night and feels cold all day. No fevers at home.          HISTORY OF PRESENT ILLNESS    73-year-old female, history of hyperlipidemia, comes emergency room with persistent nausea, decreased p.o. intake when it comes to solid foods.  Been going on over the last 2 weeks ever since she was COVID-positive.  The majority of her COVID symptoms have resolved however she is dealing with lingering and persistent nausea.  She is able to tolerate liquids however complains of a 10 pound weight loss in the last 2 weeks due to decreased oral intake in terms of solids.  No chest pain, shortness of breath.  No abdominal pain.  Recently treated for UTI and pyelonephritis with Augmentin, is having a CT done tomorrow as an outpatient.  No abdominal pain, flank pain currently.  No cough, congestion, rhinorrhea currently.  She does not smoke.  No regular drug or alcohol use.  She also has been having hot and cold sweats particularly at night as well.      History provided by:  Patient      Nursing Notes were reviewed.    PAST MEDICAL HISTORY   No past medical history on file.      SURGICAL HISTORY       Past Surgical History:   Procedure Laterality Date    FOOT SURGERY  07/22/2013    Foot Surgery    TUBAL LIGATION  07/22/2013    Tubal Ligation         CURRENT MEDICATIONS       Previous Medications    ATORVASTATIN (LIPITOR) 20 MG TABLET    Take 1 tablet (20 mg) by mouth once daily.    CALCIUM CARBONATE (CALCIUM 600 ORAL)    Take 1 tablet by mouth once daily.    CHOLECALCIFEROL (VITAMIN D-3) 50 MCG (2000 UT) TABLET    Take 1 tablet (50 mcg) by mouth once daily.    MAGNESIUM OXIDE (MAG-OX)  400 MG TABLET    Take 1 tablet (400 mg) by mouth once daily.    SACCHAROMYCES BOULARDII (FLORASTOR) 250 MG CAPSULE    Take 1 capsule (250 mg) by mouth once daily.       ALLERGIES     Demerol [meperidine] and Medrol [methylprednisolone]    FAMILY HISTORY     No family history on file.       SOCIAL HISTORY       Social History     Socioeconomic History    Marital status:    Tobacco Use    Smoking status: Never    Smokeless tobacco: Never     Social Determinants of Health     Financial Resource Strain: Low Risk  (7/17/2024)    Overall Financial Resource Strain (CARDIA)     Difficulty of Paying Living Expenses: Not hard at all   Transportation Needs: No Transportation Needs (7/16/2024)    PRAPARE - Transportation     Lack of Transportation (Medical): No     Lack of Transportation (Non-Medical): No   Housing Stability: Low Risk  (7/16/2024)    Housing Stability Vital Sign     Unable to Pay for Housing in the Last Year: No     Number of Times Moved in the Last Year: 1     Homeless in the Last Year: No       SCREENINGS                        PHYSICAL EXAM    (up to 7 for level 4, 8 or more for level 5)     ED Triage Vitals [08/27/24 0736]   Temperature Heart Rate Respirations BP   36.4 °C (97.5 °F) 76 14 133/75      Pulse Ox Temp Source Heart Rate Source Patient Position   98 % Temporal Monitor Sitting      BP Location FiO2 (%)     Right arm --       Physical Exam  Vitals and nursing note reviewed.   Constitutional:       General: She is not in acute distress.  HENT:      Head: Normocephalic and atraumatic.   Eyes:      General: No scleral icterus.        Right eye: No discharge.         Left eye: No discharge.      Conjunctiva/sclera: Conjunctivae normal.   Cardiovascular:      Rate and Rhythm: Normal rate and regular rhythm.      Pulses: Normal pulses.   Pulmonary:      Effort: Pulmonary effort is normal.   Abdominal:      General: Abdomen is flat.      Palpations: Abdomen is soft.      Tenderness: There is no  abdominal tenderness. There is no guarding or rebound.   Musculoskeletal:         General: No deformity.      Right lower leg: No edema.      Left lower leg: No edema.   Skin:     General: Skin is warm and dry.   Neurological:      Mental Status: She is alert and oriented to person, place, and time. Mental status is at baseline.   Psychiatric:         Mood and Affect: Mood normal.         Behavior: Behavior normal.          DIAGNOSTIC RESULTS     LABS:  Labs Reviewed   CBC WITH AUTO DIFFERENTIAL - Abnormal       Result Value    WBC 12.6 (*)     nRBC 0.0      RBC 4.29      Hemoglobin 12.2      Hematocrit 38.0      MCV 89      MCH 28.4      MCHC 32.1      RDW 12.5      Platelets 261      Neutrophils % 84.3      Immature Granulocytes %, Automated 0.6      Lymphocytes % 6.9      Monocytes % 7.8      Eosinophils % 0.2      Basophils % 0.2      Neutrophils Absolute 10.64 (*)     Immature Granulocytes Absolute, Automated 0.08      Lymphocytes Absolute 0.87      Monocytes Absolute 0.98 (*)     Eosinophils Absolute 0.02      Basophils Absolute 0.02     COMPREHENSIVE METABOLIC PANEL - Abnormal    Glucose 102 (*)     Sodium 137      Potassium 4.5      Chloride 99      Bicarbonate 29      Anion Gap 14      Urea Nitrogen 31 (*)     Creatinine 1.36 (*)     eGFR 41 (*)     Calcium 9.8      Albumin 4.0      Alkaline Phosphatase 52      Total Protein 7.7      AST 20      Bilirubin, Total 0.8      ALT 16     TROPONIN I, HIGH SENSITIVITY - Abnormal    Troponin I, High Sensitivity 20 (*)     Narrative:     Less than 99th percentile of normal range cutoff-  Female and children under 18 years old <14 ng/L; Male <21 ng/L: Negative  Repeat testing should be performed if clinically indicated.     Female and children under 18 years old 14-50 ng/L; Male 21-50 ng/L:  Consistent with possible cardiac damage and possible increased clinical   risk. Serial measurements may help to assess extent of myocardial damage.     >50 ng/L: Consistent with  cardiac damage, increased clinical risk and  myocardial infarction. Serial measurements may help assess extent of   myocardial damage.      NOTE: Children less than 1 year old may have higher baseline troponin   levels and results should be interpreted in conjunction with the overall   clinical context.     NOTE: Troponin I testing is performed using a different   testing methodology at [facility] than at other   [facility]. Direct result comparisons should only   be made within the same method.   URINALYSIS WITH REFLEX CULTURE AND MICROSCOPIC - Abnormal    Color, Urine Light-Orange (*)     Appearance, Urine Ex.Turbid (*)     Specific Gravity, Urine 1.023      pH, Urine 6.5      Protein, Urine 70 (1+) (*)     Glucose, Urine Normal      Blood, Urine 0.1 (1+) (*)     Ketones, Urine NEGATIVE      Bilirubin, Urine NEGATIVE      Urobilinogen, Urine Normal      Nitrite, Urine NEGATIVE      Leukocyte Esterase, Urine 500 Jesus/µL (*)    TROPONIN I, HIGH SENSITIVITY - Abnormal    Troponin I, High Sensitivity 16 (*)     Narrative:     Less than 99th percentile of normal range cutoff-  Female and children under 18 years old <14 ng/L; Male <21 ng/L: Negative  Repeat testing should be performed if clinically indicated.     Female and children under 18 years old 14-50 ng/L; Male 21-50 ng/L:  Consistent with possible cardiac damage and possible increased clinical   risk. Serial measurements may help to assess extent of myocardial damage.     >50 ng/L: Consistent with cardiac damage, increased clinical risk and  myocardial infarction. Serial measurements may help assess extent of   myocardial damage.      NOTE: Children less than 1 year old may have higher baseline troponin   levels and results should be interpreted in conjunction with the overall   clinical context.     NOTE: Troponin I testing is performed using a different   testing methodology at [facility] than at other   [facility].  Direct result comparisons should only   be made within the same method.   MICROSCOPIC ONLY, URINE - Abnormal    WBC, Urine >50 (*)     WBC Clumps, Urine MANY      RBC, Urine >20 (*)     Squamous Epithelial Cells, Urine 26-50 (1+)      Mucus, Urine 2+     LIPASE - Normal    Lipase 43      Narrative:     Venipuncture immediately after or during the administration of Metamizole may lead to falsely low results. Testing should be performed immediately prior to Metamizole dosing.   URINE CULTURE   URINALYSIS WITH REFLEX CULTURE AND MICROSCOPIC    Narrative:     The following orders were created for panel order Urinalysis with Reflex Culture and Microscopic.  Procedure                               Abnormality         Status                     ---------                               -----------         ------                     Urinalysis with Reflex C...[[id number]]  Abnormal            Final result               Extra Urine Gray Tube[[id number]]                            In process                   Please view results for these tests on the individual orders.   EXTRA URINE GRAY TUBE       All other labs were within normal range or not returned as of this dictation.    Imaging  XR chest 1 view   Final Result   No acute cardiopulmonary process.        MACRO:   None        Signed by: Martha Navarro 8/27/2024 9:16 AM   Dictation workstation:   [id number]           Procedures  Procedures     EMERGENCY DEPARTMENT COURSE/MDM:     Diagnoses as of 08/27/24 1015   Nausea        Medical Decision Making  73-year-old female comes emergency room with nausea going on over the last 2 weeks.  He says she gets very nauseous after she eats solid foods, and has had an associated weight loss secondary to decreased oral solid food intake.  She is able to tolerate fluids however.  Cardiac workup ordered to assess the fact that this could possibly be an ACS equivalent, lipase ordered as well to assess for possible pancreatic etiology, did  recheck a urine given she recently was treated for a UTI.  CBC and chemistry ordered as well, assess for possible derangements in electrolytes, renal function secondary to her poor p.o. intake.    My independent rotation of labs, CBC showed a small leukocytosis, 12.6, chemistry showed an acute kidney injury treated with a liter of IV lactated Ringer's.  Troponin was initially slightly elevated, did downtrend, I suspect it is likely secondary to poor renal clearance, 70 bpm sinus rhythm, QTc 408, no acute injury pattern on EKG, I do not think that this is ACS given duration of her symptoms and downtrending troponin.  Urinalysis showed leukocyte esterase, white cells, red cells, was contaminated with squamous cells, she is not having urinary symptoms, given she was just treated for UTI, will defer to urine culture at this time.  No signs of pneumonia, pneumothorax on chest x-ray here today.    Reassessment after being given IV Zofran, 4 mg, patient did tolerate oral intake, oral fluids.  Patient was discharged home with a GI referral, prescription for Pepcid, Carafate and Zofran sent to her pharmacy, she will follow-up with her PCP as well, return for new, concerning or worsening symptoms.      =================Attending note===============    The patient was seen by the resident/fellow.  I have personally performed a substantive portion of the encounter.  I have seen and examined the patient; agree with the workup, evaluation, MDM,   management and diagnosis.  The care plan has been discussed with the resident; I have reviewed the resident's note and agree with the documented findings.      This is a 73 y.o. female who presents to ER with nausea.  She had COVID around 2 weeks ago and is in this nausea started.  She states her respiratory symptoms have improved but she still having his nausea.  States is probably only when she eats solids.  She can drink liquids fine.  States she has had a 10 pound weight loss since  the symptoms started.  She did have a recent urinary tract infection in August and completed a course of Augmentin.  So she has had some hot and cold sweats.  No diarrhea.  No fevers.  No chest pain or shortness of breath.  There is no abdominal pain with this.  She does have a history of hyperlipidemia.  Heart is regular.  Lungs are clear.  Abdomen is soft and nontender.  No guarding or rebound.  No peritoneal signs.  She states she is due to get a CT scan tomorrow for follow-up from her pyelonephritis.    EKG: Normal sinus rhythm with a ventricular rate of 70.  .  QTc 408.  No ST changes.    Chemistry shows some mild renal insufficiency with a GFR of 41.  White count is minimally elevated at 12.6.    ==========================================      Amount and/or Complexity of Data Reviewed  Labs: ordered.  Radiology: ordered.  ECG/medicine tests: ordered.        Patient and or family in agreement and understanding of treatment plan.  All questions answered.      I reviewed the case with the attending ED physician. The attending ED physician agrees with the plan. Patient and/or patient´s representative was counseled regarding labs, imaging, likely diagnosis, and plan. All questions were answered.    ED Medications administered this visit:    Medications   ondansetron (Zofran) injection 4 mg (4 mg intravenous Given 8/27/24 0813)   lactated Ringer's bolus 1,000 mL (1,000 mL intravenous New Bag 8/27/24 0813)       New Prescriptions from this visit:    New Prescriptions    FAMOTIDINE (PEPCID) 20 MG TABLET    Take 1 tablet (20 mg) by mouth 2 times a day for 15 days.    ONDANSETRON (ZOFRAN) 4 MG TABLET    Take 1 tablet (4 mg) by mouth every 6 hours for 3 days.    SUCRALFATE (CARAFATE) 100 MG/ML SUSPENSION    Take 10 mL (1 g) by mouth 4 times a day.       Follow-up:  Antony Munoz MD  [address]  [facility] [id number] [phone]    In 1 day          Final Impression:   1. Nausea           (Please note that portions of this note were completed with a voice recognition program.  Efforts were made to edit the dictations but occasionally words are mis-transcribed.)     DO Nitza French  08/27/24 1015       Danny Plata, DO [place]  08/27/24 1015

## 2024-08-28 ENCOUNTER — HOSPITAL ENCOUNTER (OUTPATIENT)
Dept: RADIOLOGY | Facility: HOSPITAL | Age: 74
Discharge: HOME | End: 2024-08-28
Payer: MEDICARE

## 2024-08-28 DIAGNOSIS — N13.30 HYDRONEPHROSIS, LEFT: ICD-10-CM

## 2024-08-28 DIAGNOSIS — N13.4 HYDROURETER ON LEFT: ICD-10-CM

## 2024-08-28 LAB — BACTERIA UR CULT: NORMAL

## 2024-08-28 PROCEDURE — 76376 3D RENDER W/INTRP POSTPROCES: CPT | Performed by: RADIOLOGY

## 2024-08-28 PROCEDURE — 2550000001 HC RX 255 CONTRASTS: Performed by: UROLOGY

## 2024-08-28 PROCEDURE — 76377 3D RENDER W/INTRP POSTPROCES: CPT

## 2024-08-28 PROCEDURE — 74178 CT ABD&PLV WO CNTR FLWD CNTR: CPT | Performed by: RADIOLOGY

## 2024-09-02 LAB
ATRIAL RATE: 70 BPM
P AXIS: 78 DEGREES
P OFFSET: 204 MS
P ONSET: 149 MS
PR INTERVAL: 148 MS
Q ONSET: 223 MS
QRS COUNT: 11 BEATS
QRS DURATION: 68 MS
QT INTERVAL: 378 MS
QTC CALCULATION(BAZETT): 408 MS
QTC FREDERICIA: 398 MS
R AXIS: 75 DEGREES
T AXIS: 75 DEGREES
T OFFSET: 412 MS
VENTRICULAR RATE: 70 BPM

## 2024-09-12 ENCOUNTER — APPOINTMENT (OUTPATIENT)
Dept: RADIOLOGY | Facility: HOSPITAL | Age: 74
End: 2024-09-12
Payer: MEDICARE

## 2024-09-12 ENCOUNTER — ANESTHESIA (OUTPATIENT)
Dept: OPERATING ROOM | Facility: HOSPITAL | Age: 74
End: 2024-09-12
Payer: MEDICARE

## 2024-09-12 ENCOUNTER — ANESTHESIA EVENT (OUTPATIENT)
Dept: OPERATING ROOM | Facility: HOSPITAL | Age: 74
End: 2024-09-12
Payer: MEDICARE

## 2024-09-12 ENCOUNTER — HOSPITAL ENCOUNTER (OUTPATIENT)
Facility: HOSPITAL | Age: 74
Setting detail: OUTPATIENT SURGERY
Discharge: HOME | End: 2024-09-12
Attending: UROLOGY | Admitting: UROLOGY
Payer: MEDICARE

## 2024-09-12 VITALS
HEART RATE: 74 BPM | TEMPERATURE: 97.2 F | OXYGEN SATURATION: 99 % | SYSTOLIC BLOOD PRESSURE: 126 MMHG | WEIGHT: 154.1 LBS | RESPIRATION RATE: 17 BRPM | BODY MASS INDEX: 25.64 KG/M2 | DIASTOLIC BLOOD PRESSURE: 58 MMHG

## 2024-09-12 DIAGNOSIS — N30.01 ACUTE CYSTITIS WITH HEMATURIA: ICD-10-CM

## 2024-09-12 DIAGNOSIS — N28.89 URETERAL MASS: Primary | ICD-10-CM

## 2024-09-12 DIAGNOSIS — N13.30 HYDRONEPHROSIS, UNSPECIFIED HYDRONEPHROSIS TYPE: ICD-10-CM

## 2024-09-12 PROBLEM — R11.2 PONV (POSTOPERATIVE NAUSEA AND VOMITING): Status: ACTIVE | Noted: 2024-09-12

## 2024-09-12 PROBLEM — Z98.890 PONV (POSTOPERATIVE NAUSEA AND VOMITING): Status: ACTIVE | Noted: 2024-09-12

## 2024-09-12 LAB
ALBUMIN SERPL BCP-MCNC: 4 G/DL (ref 3.4–5)
ALP SERPL-CCNC: 50 U/L (ref 33–136)
ALT SERPL W P-5'-P-CCNC: 17 U/L (ref 7–45)
ANION GAP SERPL CALC-SCNC: 15 MMOL/L (ref 10–20)
AST SERPL W P-5'-P-CCNC: 20 U/L (ref 9–39)
BILIRUB SERPL-MCNC: 0.5 MG/DL (ref 0–1.2)
BUN SERPL-MCNC: 23 MG/DL (ref 6–23)
CALCIUM SERPL-MCNC: 9.6 MG/DL (ref 8.6–10.3)
CHLORIDE SERPL-SCNC: 101 MMOL/L (ref 98–107)
CO2 SERPL-SCNC: 27 MMOL/L (ref 21–32)
CREAT SERPL-MCNC: 1.2 MG/DL (ref 0.5–1.05)
EGFRCR SERPLBLD CKD-EPI 2021: 48 ML/MIN/1.73M*2
ERYTHROCYTE [DISTWIDTH] IN BLOOD BY AUTOMATED COUNT: 12.6 % (ref 11.5–14.5)
GLUCOSE SERPL-MCNC: 90 MG/DL (ref 74–99)
HCT VFR BLD AUTO: 37.1 % (ref 36–46)
HGB BLD-MCNC: 11.4 G/DL (ref 12–16)
MCH RBC QN AUTO: 27.5 PG (ref 26–34)
MCHC RBC AUTO-ENTMCNC: 30.7 G/DL (ref 32–36)
MCV RBC AUTO: 89 FL (ref 80–100)
NRBC BLD-RTO: 0 /100 WBCS (ref 0–0)
PLATELET # BLD AUTO: 299 X10*3/UL (ref 150–450)
POTASSIUM SERPL-SCNC: 4.3 MMOL/L (ref 3.5–5.3)
PROT SERPL-MCNC: 7.6 G/DL (ref 6.4–8.2)
RBC # BLD AUTO: 4.15 X10*6/UL (ref 4–5.2)
SODIUM SERPL-SCNC: 139 MMOL/L (ref 136–145)
WBC # BLD AUTO: 10.4 X10*3/UL (ref 4.4–11.3)

## 2024-09-12 PROCEDURE — 7100000009 HC PHASE TWO TIME - INITIAL BASE CHARGE: Performed by: UROLOGY

## 2024-09-12 PROCEDURE — 3700000002 HC GENERAL ANESTHESIA TIME - EACH INCREMENTAL 1 MINUTE: Performed by: UROLOGY

## 2024-09-12 PROCEDURE — 3700000001 HC GENERAL ANESTHESIA TIME - INITIAL BASE CHARGE: Performed by: UROLOGY

## 2024-09-12 PROCEDURE — C1758 CATHETER, URETERAL: HCPCS | Performed by: UROLOGY

## 2024-09-12 PROCEDURE — 7100000001 HC RECOVERY ROOM TIME - INITIAL BASE CHARGE: Performed by: UROLOGY

## 2024-09-12 PROCEDURE — 85027 COMPLETE CBC AUTOMATED: CPT | Performed by: UROLOGY

## 2024-09-12 PROCEDURE — 2500000004 HC RX 250 GENERAL PHARMACY W/ HCPCS (ALT 636 FOR OP/ED): Mod: JZ | Performed by: UROLOGY

## 2024-09-12 PROCEDURE — 88305 TISSUE EXAM BY PATHOLOGIST: CPT | Mod: TC,STJLAB | Performed by: UROLOGY

## 2024-09-12 PROCEDURE — 80053 COMPREHEN METABOLIC PANEL: CPT | Performed by: UROLOGY

## 2024-09-12 PROCEDURE — 2720000007 HC OR 272 NO HCPCS: Performed by: UROLOGY

## 2024-09-12 PROCEDURE — 36415 COLL VENOUS BLD VENIPUNCTURE: CPT | Performed by: UROLOGY

## 2024-09-12 PROCEDURE — C1894 INTRO/SHEATH, NON-LASER: HCPCS | Performed by: UROLOGY

## 2024-09-12 PROCEDURE — 2500000005 HC RX 250 GENERAL PHARMACY W/O HCPCS: Performed by: ANESTHESIOLOGY

## 2024-09-12 PROCEDURE — 3600000008 HC OR TIME - EACH INCREMENTAL 1 MINUTE - PROCEDURE LEVEL THREE: Performed by: UROLOGY

## 2024-09-12 PROCEDURE — 7100000002 HC RECOVERY ROOM TIME - EACH INCREMENTAL 1 MINUTE: Performed by: UROLOGY

## 2024-09-12 PROCEDURE — C2617 STENT, NON-COR, TEM W/O DEL: HCPCS | Performed by: UROLOGY

## 2024-09-12 PROCEDURE — 2500000004 HC RX 250 GENERAL PHARMACY W/ HCPCS (ALT 636 FOR OP/ED): Performed by: ANESTHESIOLOGIST ASSISTANT

## 2024-09-12 PROCEDURE — 2500000005 HC RX 250 GENERAL PHARMACY W/O HCPCS: Performed by: ANESTHESIOLOGIST ASSISTANT

## 2024-09-12 PROCEDURE — 3600000003 HC OR TIME - INITIAL BASE CHARGE - PROCEDURE LEVEL THREE: Performed by: UROLOGY

## 2024-09-12 PROCEDURE — 7100000010 HC PHASE TWO TIME - EACH INCREMENTAL 1 MINUTE: Performed by: UROLOGY

## 2024-09-12 PROCEDURE — 2780000003 HC OR 278 NO HCPCS: Performed by: UROLOGY

## 2024-09-12 DEVICE — URETERAL STENT
Type: IMPLANTABLE DEVICE | Site: URETER | Status: FUNCTIONAL
Brand: CONTOUR™

## 2024-09-12 RX ORDER — ALBUTEROL SULFATE 0.83 MG/ML
2.5 SOLUTION RESPIRATORY (INHALATION) ONCE AS NEEDED
Status: DISCONTINUED | OUTPATIENT
Start: 2024-09-12 | End: 2024-09-12 | Stop reason: HOSPADM

## 2024-09-12 RX ORDER — OXYCODONE HYDROCHLORIDE 5 MG/1
5 TABLET ORAL EVERY 4 HOURS PRN
Status: DISCONTINUED | OUTPATIENT
Start: 2024-09-12 | End: 2024-09-12 | Stop reason: HOSPADM

## 2024-09-12 RX ORDER — OXYBUTYNIN CHLORIDE 5 MG/1
5 TABLET ORAL 3 TIMES DAILY PRN
Qty: 90 TABLET | Refills: 0 | Status: SHIPPED | OUTPATIENT
Start: 2024-09-12 | End: 2024-10-12

## 2024-09-12 RX ORDER — ACETAMINOPHEN 325 MG/1
975 TABLET ORAL ONCE
Status: DISCONTINUED | OUTPATIENT
Start: 2024-09-12 | End: 2024-09-12 | Stop reason: HOSPADM

## 2024-09-12 RX ORDER — ONDANSETRON HYDROCHLORIDE 2 MG/ML
4 INJECTION, SOLUTION INTRAVENOUS ONCE AS NEEDED
Status: DISCONTINUED | OUTPATIENT
Start: 2024-09-12 | End: 2024-09-12 | Stop reason: HOSPADM

## 2024-09-12 RX ORDER — ONDANSETRON HYDROCHLORIDE 2 MG/ML
INJECTION, SOLUTION INTRAVENOUS AS NEEDED
Status: DISCONTINUED | OUTPATIENT
Start: 2024-09-12 | End: 2024-09-12

## 2024-09-12 RX ORDER — LIDOCAINE HYDROCHLORIDE 20 MG/ML
INJECTION, SOLUTION EPIDURAL; INFILTRATION; INTRACAUDAL; PERINEURAL AS NEEDED
Status: DISCONTINUED | OUTPATIENT
Start: 2024-09-12 | End: 2024-09-12

## 2024-09-12 RX ORDER — SODIUM CHLORIDE, SODIUM LACTATE, POTASSIUM CHLORIDE, CALCIUM CHLORIDE 600; 310; 30; 20 MG/100ML; MG/100ML; MG/100ML; MG/100ML
100 INJECTION, SOLUTION INTRAVENOUS CONTINUOUS
Status: DISCONTINUED | OUTPATIENT
Start: 2024-09-12 | End: 2024-09-12 | Stop reason: HOSPADM

## 2024-09-12 RX ORDER — HYDROMORPHONE HYDROCHLORIDE 0.2 MG/ML
0.2 INJECTION INTRAMUSCULAR; INTRAVENOUS; SUBCUTANEOUS EVERY 5 MIN PRN
Status: DISCONTINUED | OUTPATIENT
Start: 2024-09-12 | End: 2024-09-12 | Stop reason: HOSPADM

## 2024-09-12 RX ORDER — HYDRALAZINE HYDROCHLORIDE 20 MG/ML
5 INJECTION INTRAMUSCULAR; INTRAVENOUS EVERY 30 MIN PRN
Status: DISCONTINUED | OUTPATIENT
Start: 2024-09-12 | End: 2024-09-12 | Stop reason: HOSPADM

## 2024-09-12 RX ORDER — LABETALOL HYDROCHLORIDE 5 MG/ML
5 INJECTION, SOLUTION INTRAVENOUS ONCE AS NEEDED
Status: DISCONTINUED | OUTPATIENT
Start: 2024-09-12 | End: 2024-09-12 | Stop reason: HOSPADM

## 2024-09-12 RX ORDER — FENTANYL CITRATE 50 UG/ML
INJECTION, SOLUTION INTRAMUSCULAR; INTRAVENOUS AS NEEDED
Status: DISCONTINUED | OUTPATIENT
Start: 2024-09-12 | End: 2024-09-12

## 2024-09-12 RX ORDER — SODIUM CHLORIDE, SODIUM LACTATE, POTASSIUM CHLORIDE, CALCIUM CHLORIDE 600; 310; 30; 20 MG/100ML; MG/100ML; MG/100ML; MG/100ML
INJECTION, SOLUTION INTRAVENOUS CONTINUOUS PRN
Status: DISCONTINUED | OUTPATIENT
Start: 2024-09-12 | End: 2024-09-12

## 2024-09-12 RX ORDER — OXYCODONE HYDROCHLORIDE 5 MG/1
5 TABLET ORAL EVERY 6 HOURS PRN
Qty: 8 TABLET | Refills: 0 | Status: SHIPPED | OUTPATIENT
Start: 2024-09-12 | End: 2024-09-15

## 2024-09-12 RX ORDER — PHENYLEPHRINE HCL IN 0.9% NACL 1 MG/10 ML
SYRINGE (ML) INTRAVENOUS AS NEEDED
Status: DISCONTINUED | OUTPATIENT
Start: 2024-09-12 | End: 2024-09-12

## 2024-09-12 RX ORDER — LIDOCAINE HYDROCHLORIDE 10 MG/ML
0.1 INJECTION, SOLUTION INFILTRATION; PERINEURAL ONCE
Status: DISCONTINUED | OUTPATIENT
Start: 2024-09-12 | End: 2024-09-12 | Stop reason: HOSPADM

## 2024-09-12 RX ORDER — MIDAZOLAM HYDROCHLORIDE 1 MG/ML
INJECTION, SOLUTION INTRAMUSCULAR; INTRAVENOUS AS NEEDED
Status: DISCONTINUED | OUTPATIENT
Start: 2024-09-12 | End: 2024-09-12

## 2024-09-12 RX ORDER — PROPOFOL 10 MG/ML
INJECTION, EMULSION INTRAVENOUS AS NEEDED
Status: DISCONTINUED | OUTPATIENT
Start: 2024-09-12 | End: 2024-09-12

## 2024-09-12 RX ORDER — OXYCODONE AND ACETAMINOPHEN 5; 325 MG/1; MG/1
1 TABLET ORAL EVERY 4 HOURS PRN
Status: DISCONTINUED | OUTPATIENT
Start: 2024-09-12 | End: 2024-09-12 | Stop reason: HOSPADM

## 2024-09-12 RX ORDER — CEFAZOLIN SODIUM 2 G/100ML
2 INJECTION, SOLUTION INTRAVENOUS
Status: COMPLETED | OUTPATIENT
Start: 2024-09-12 | End: 2024-09-12

## 2024-09-12 RX ORDER — DIPHENHYDRAMINE HYDROCHLORIDE 50 MG/ML
12.5 INJECTION INTRAMUSCULAR; INTRAVENOUS ONCE AS NEEDED
Status: DISCONTINUED | OUTPATIENT
Start: 2024-09-12 | End: 2024-09-12 | Stop reason: HOSPADM

## 2024-09-12 RX ORDER — CEPHALEXIN 500 MG/1
500 CAPSULE ORAL 3 TIMES DAILY
Qty: 9 CAPSULE | Refills: 0 | Status: SHIPPED | OUTPATIENT
Start: 2024-09-12 | End: 2024-09-15

## 2024-09-12 SDOH — HEALTH STABILITY: MENTAL HEALTH: CURRENT SMOKER: 0

## 2024-09-12 ASSESSMENT — PAIN - FUNCTIONAL ASSESSMENT
PAIN_FUNCTIONAL_ASSESSMENT: 0-10
PAIN_FUNCTIONAL_ASSESSMENT: UNABLE TO SELF-REPORT

## 2024-09-12 ASSESSMENT — PAIN SCALES - GENERAL
PAINLEVEL_OUTOF10: 0 - NO PAIN

## 2024-09-12 NOTE — OP NOTE
Operative Note    Location: [facility] [place], [place], [place]    Date: 9/12/2024    Veronica Keith  [id number]    Surgeon: Issa Boyd MD    Assistant: None    Pre-operative diagnosis: Left ureteral mass, left hydronephrosis    Post-operative diagnosis: Left ureteral mass, left hydronephrosis    Procedure performed: Cystoscopy, transurethral resection bladder tumor (small), left retrograde pyelogram, left ureteroscopy, biopsy and complete laser ablation of left ureteral tumor, left ureteral stent placement    Anesthesia: General     Estimated blood loss: Minimal     Complications: none    Specimens: Left ureteral biopsy for pathology, bladder tumor resection for pathology    Drains: 7 x 26 double-J ureteral stent    Indications: Veronica Keith is a 73 y.o. female who presented with left lower quadrant abdominal pain.  CT scan demonstrated left hydronephrosis and the patient was found to urinary tract infection.  Follow-up CT scan after treatment with antibiotics demonstrated a likely left distal ureteral mass with proximal hydronephrosis. She now presents for left ureteroscopy with ureteral biopsy, ablation, and stent placement.     Operative summary:   The risks and benefits of the procedure explained to the patient in the preoperative area and after informed consent was obtained the patient was taken back to the operative room.  The patient was transferred to the operating table and placed in the supine position. Prior to induction of anesthesia, EPC cuffs were on and functioning.  The patient received Ancef intravenously.  General anesthesia was induced and the patient was placed in the dorsal lithotomy position. The patient was prepped and draped in a sterile fashion and a time-out was performed to confirm patient identity, procedure, and laterality.  A 22 Cambodian cystoscope with a 30 degree lens was inserted through the patient's urethra and into the bladder.  Upon entering the bladder, a thorough cystoscopic  evaluation was performed beginning at the dome and continuing along the left lateral wall, the floor and trigone, the right lateral wall, and the posterior wall.  Bilateral ureteral orifices were then visualized.  A small papillary tumor was seen approximately 2 cm lateral to the right ureteral orifice.  This was fully resected using a bipolar resectoscope and the specimen was sent for analysis.  Resection diameter was approximately 1 cm.  Attention was then turned to the left ureteral orifice.  A guidewire was advanced into the left ureteral orifice up to the level of the kidney under fluoroscopy.  A dual-lumen catheter was placed to gently dilate the distal ureter and a retrograde pyelogram was performed through the dual-lumen catheter.  This showed a filling defect in the distal ureter approximately 4 cm proximal to the ureteral orifice.  Proximal to this area no additional filling defects were noted.  The ureter was noted to be dilated.  A second wire was placed through the dual-lumen catheter.  A semirigid ureteroscope was advanced over the guidewire and into the distal ureter where the tumor was encountered.  The tumor had a mildly papillary and bulky appearance.  Using a BIGopsy biopsy forceps and snake technique with a 0-tip basket, tissue was resected from the tumor and sent for pathology. The remainder of papillary tumor and base of the tumor, which circumferentially involved the ureteral wall, was ablated using a 365 micron laser fiber.  At the conclusion of the procedure, no additional tumor was visible.  The ureteroscope was removed and a 7 x 26 double-J ureteral stent was advanced over the guidewire and up to the kidney under fluoroscopy.  The guidewire was removed and a good proximal curl seen on fluoroscopy.  A good distal curl was seen under direct visualization.  Bladder was drained through the cystoscope and the patient was awoken and transferred to PACU.  All instruments and equipment were  accounted for at the end of the procedure.    Disposition:  Patient was transferred to PACU in good condition.    Follow-up:  The patient will follow up in 3 weeks for pathology discussion and possible stent removal.    Issa Boyd MD  9/12/2024 2:45 PM

## 2024-09-12 NOTE — ANESTHESIA PROCEDURE NOTES
Airway  Date/Time: 9/12/2024 2:46 PM  Urgency: elective    Airway not difficult    Staffing  Performed: [facility]   Authorized by: Brandon Milan MD    Performed by: PAULA Sanchez  Patient location during procedure: OR    Indications and Patient Condition  Indications for airway management: anesthesia  Spontaneous ventilation: present  Sedation level: deep  Preoxygenated: yes  Mask difficulty assessment: 0 - not attempted    Final Airway Details  Final airway type: supraglottic airway      Successful airway: unique  Size 4     Number of attempts at approach: 1

## 2024-09-12 NOTE — ANESTHESIA POSTPROCEDURE EVALUATION
Patient: Veronica Keith    Procedure Summary       Date: 09/12/24 Room / Location: [facility] OR 06 / Virtual [facility] OR    Anesthesia Start: 1442 Anesthesia Stop: 1634    Procedure: left ureteroscopy with biopsy and HOLMIUM laser fulguration (Left) Diagnosis:       Hydronephrosis, unspecified hydronephrosis type      (Hydronephrosis, unspecified hydronephrosis type [N13.30])    Surgeons: Issa Boyd MD Responsible Provider: Chayo Milian MD    Anesthesia Type: general ASA Status: 2            Anesthesia Type: general    Vitals Value Taken Time   /65 09/12/24 1634   Temp 36.3 °C (97.3 °F) 09/12/24 1634   Pulse 72 09/12/24 1638   Resp 15 09/12/24 1638   SpO2 100 % 09/12/24 1638   Vitals shown include unfiled device data.    Anesthesia Post Evaluation    Patient location during evaluation: PACU  Patient participation: complete - patient participated  Level of consciousness: awake and alert  Pain management: adequate  Airway patency: patent  Cardiovascular status: acceptable  Respiratory status: acceptable  Hydration status: acceptable  Postoperative Nausea and Vomiting: none        No notable events documented.

## 2024-09-12 NOTE — ANESTHESIA PREPROCEDURE EVALUATION
Patient: Veronica Keith    Procedure Information       Date/Time: 09/12/24 1355    Procedure: left ureteroscopy with biopsy and HOLMIUM laser fulguration (Left)    Location: [facility] OR 06 / [facility] OR    Surgeons: Issa Boyd MD            Relevant Problems   Anesthesia   (+) PONV (postoperative nausea and vomiting)      /Renal   (+) Acute cystitis with hematuria   (+) Other hydronephrosis       Clinical information reviewed:   Tobacco  Allergies  Meds   Med Hx  Surg Hx  OB Status  Fam Hx  Soc   Hx        NPO Detail:  NPO/Void Status  Carbohydrate Drink Given Prior to Surgery? : N  Date of Last Liquid: 09/12/24  Time of Last Liquid: 1000  Date of Last Solid: 09/11/24  Time of Last Solid: 2000  Last Intake Type: Clear fluids  Time of Last Void: 1327         Physical Exam    Airway  Mallampati: II  TM distance: >3 FB  Neck ROM: full     Cardiovascular   Rhythm: regular  Rate: normal     Dental - normal exam     Pulmonary   Breath sounds clear to auscultation     Abdominal            Anesthesia Plan    History of general anesthesia?: yes  History of complications of general anesthesia?: no    ASA 2     general     The patient is not a current smoker.    intravenous induction   Anesthetic plan and risks discussed with patient.    Plan discussed with CAA.

## 2024-09-12 NOTE — H&P
History and Physical    Patient:  Veronica Keith  MRN: [id number]  YOB: 1950    CHIEF COMPLAINT:  Left ureteral mass    HISTORY OF PRESENT ILLNESS:   The patient is a 73 y.o. female who presents with left ureteral mass    Past Medical History:    Past Medical History:   Diagnosis Date    PONV (postoperative nausea and vomiting)        Past Surgical History:    Past Surgical History:   Procedure Laterality Date    COLONOSCOPY      FOOT SURGERY Bilateral 07/22/2013    Foot Surgery    TUBAL LIGATION  07/22/2013    Tubal Ligation    UPPER GASTROINTESTINAL ENDOSCOPY         Medications Prior to Admission:    Prior to Admission medications    Medication Sig Start Date End Date Taking? Authorizing Provider   atorvastatin (Lipitor) 20 mg tablet Take 1 tablet (20 mg) by mouth once daily.   Yes Historical Provider, MD   calcium carbonate (CALCIUM 600 ORAL) Take 1 tablet by mouth once daily.   Yes Historical Provider, MD   cholecalciferol (Vitamin D-3) 50 MCG (2000 UT) tablet Take 1 tablet (50 mcg) by mouth once daily.   Yes Historical Provider, MD   famotidine (Pepcid) 20 mg tablet Take 1 tablet (20 mg) by mouth 2 times a day for 15 days. 8/27/24 9/12/24 Yes Danny Plata DO   magnesium oxide (Mag-Ox) 400 mg tablet Take 1 tablet (400 mg) by mouth once daily.   Yes Historical Provider, MD   sucralfate (Carafate) 100 mg/mL suspension Take 10 mL (1 g) by mouth 4 times a day. 8/27/24 9/26/24 Yes Danny Plata DO   saccharomyces boulardii (Florastor) 250 mg capsule Take 1 capsule (250 mg) by mouth once daily.    Historical Provider, MD       Allergies:  Demerol [meperidine] and Medrol [methylprednisolone]    Social History:    Social History     Socioeconomic History    Marital status:      Spouse name: Not on file    Number of children: Not on file    Years of education: Not on file    Highest education level: Not on file   Occupational History    Not on file   Tobacco Use    Smoking status: Never    Smokeless  "tobacco: Never   Substance and Sexual Activity    Alcohol use: Never    Drug use: Never    Sexual activity: Not on file   Other Topics Concern    Not on file   Social History Narrative    Not on file     Social Determinants of Health     Financial Resource Strain: Low Risk  (7/17/2024)    Overall Financial Resource Strain (CARDIA)     Difficulty of Paying Living Expenses: Not hard at all   Food Insecurity: Not on file   Transportation Needs: No Transportation Needs (7/16/2024)    PRAPARE - Transportation     Lack of Transportation (Medical): No     Lack of Transportation (Non-Medical): No   Physical Activity: Not on file   Stress: Not on file   Social Connections: Not on file   Intimate Partner Violence: Not on file   Housing Stability: Low Risk  (7/16/2024)    Housing Stability Vital Sign     Unable to Pay for Housing in the Last Year: No     Number of Times Moved in the Last Year: 1     Homeless in the Last Year: No       Family History:  No family history on file.    REVIEW OF SYSTEMS:  Constitutional: negative  Eyes: negative  Respiratory: negative  Cardiovascular: negative  Gastrointestinal: negative  Genitourinary: see HPI  Musculoskeletal: negative  Skin: negative   Neurological: negative  Hematological/Lymphatic: negative  Psychological: negative      Physical Exam:      Patient Vitals for the past 24 hrs:   BP Temp Pulse Resp Weight   09/12/24 1245 (!) 198/84 36.8 °C (98.2 °F) 67 18 --   09/12/24 1100 -- -- -- -- 69.9 kg (154 lb 1.6 oz)     Constitutional: Patient in no acute distress;   Neuro: alert and oriented to person place and time.    Psych: Mood and affect normal.  Lungs: Respiratory effort normal  Cardiovascular:  Normal peripheral pulses. Regular rate.  Abdomen: Soft, non-tender, non-distended        LABS:   @LABRCNT(WBC:3,HGB:3,HCT:3,MCV:3,PLT:3)@  @LAB[facility]NT(NA:3,K:3,CL:3,CO2:3,PHOS:3,BUN:3,CREATININE:3,CA:3)@  No results found for: \"PSA\"    Additional Lab/culture results:    Urinalysis: " @[facility](NITRATE,COLORU,PHUR,LABCAST,WBCUA,RBCUA,MUCUS,TRICHOMONAS,YEAST,BACTERIA,CLARITYU,SPECGRAV,LEUKOCYTESUR,UROBILINOGEN,BILIRUBINUR,BLOODU,GLUCOSEUKETONESUAMORPHOUS)@     -----------------------------------------------------------------  Imaging Results:    Assessment and Plan   Impression:    73 y.o. female with left ureteral mass, hydronephrosis    Plan:   OR today for cystoscopy left ureteroscopy, ureteral biopsy, laser ablation of ureteral mass, stent placement  The procedure was discussed with the patient.   All questions were answered and informed consent was obtained.      Isas Boyd MD  2:24 PM 9/12/2024

## 2024-09-17 LAB
LABORATORY COMMENT REPORT: NORMAL
PATH REPORT.FINAL DX SPEC: NORMAL
PATH REPORT.GROSS SPEC: NORMAL
PATH REPORT.RELEVANT HX SPEC: NORMAL
PATH REPORT.TOTAL CANCER: NORMAL

## 2024-10-23 ENCOUNTER — PRE-ADMISSION TESTING (OUTPATIENT)
Dept: PREADMISSION TESTING | Facility: HOSPITAL | Age: 74
End: 2024-10-23
Payer: MEDICARE

## 2024-10-23 ENCOUNTER — LAB (OUTPATIENT)
Dept: LAB | Facility: LAB | Age: 74
End: 2024-10-23
Payer: MEDICARE

## 2024-10-23 VITALS
TEMPERATURE: 98.6 F | DIASTOLIC BLOOD PRESSURE: 63 MMHG | HEIGHT: 66 IN | HEART RATE: 65 BPM | SYSTOLIC BLOOD PRESSURE: 137 MMHG | RESPIRATION RATE: 16 BRPM | WEIGHT: 153.66 LBS | OXYGEN SATURATION: 96 % | BODY MASS INDEX: 24.7 KG/M2

## 2024-10-23 DIAGNOSIS — R31.0 GROSS HEMATURIA: ICD-10-CM

## 2024-10-23 DIAGNOSIS — N30.01 ACUTE CYSTITIS WITH HEMATURIA: ICD-10-CM

## 2024-10-23 DIAGNOSIS — N28.89 URETERAL MASS: ICD-10-CM

## 2024-10-23 DIAGNOSIS — C66.2 MALIGNANT NEOPLASM OF LEFT URETER (MULTI): ICD-10-CM

## 2024-10-23 DIAGNOSIS — Z01.818 PREOP EXAMINATION: Primary | ICD-10-CM

## 2024-10-23 DIAGNOSIS — C66.2: ICD-10-CM

## 2024-10-23 LAB
ALBUMIN SERPL BCP-MCNC: 4.3 G/DL (ref 3.4–5)
ALP SERPL-CCNC: 56 U/L (ref 33–136)
ALT SERPL W P-5'-P-CCNC: 18 U/L (ref 7–45)
ANION GAP SERPL CALC-SCNC: 13 MMOL/L (ref 10–20)
APPEARANCE UR: CLEAR
APTT PPP: 29 SECONDS (ref 27–38)
AST SERPL W P-5'-P-CCNC: 24 U/L (ref 9–39)
BILIRUB SERPL-MCNC: 0.4 MG/DL (ref 0–1.2)
BILIRUB UR STRIP.AUTO-MCNC: NEGATIVE MG/DL
BUN SERPL-MCNC: 21 MG/DL (ref 6–23)
CALCIUM SERPL-MCNC: 9.3 MG/DL (ref 8.6–10.3)
CHLORIDE SERPL-SCNC: 102 MMOL/L (ref 98–107)
CO2 SERPL-SCNC: 29 MMOL/L (ref 21–32)
COLOR UR: ABNORMAL
CREAT SERPL-MCNC: 1.07 MG/DL (ref 0.5–1.05)
EGFRCR SERPLBLD CKD-EPI 2021: 55 ML/MIN/1.73M*2
ERYTHROCYTE [DISTWIDTH] IN BLOOD BY AUTOMATED COUNT: 14 % (ref 11.5–14.5)
GLUCOSE SERPL-MCNC: 74 MG/DL (ref 74–99)
GLUCOSE UR STRIP.AUTO-MCNC: NORMAL MG/DL
HCT VFR BLD AUTO: 38.5 % (ref 36–46)
HGB BLD-MCNC: 11.8 G/DL (ref 12–16)
HYALINE CASTS #/AREA URNS AUTO: ABNORMAL /LPF
INR PPP: 1 (ref 0.9–1.1)
KETONES UR STRIP.AUTO-MCNC: NEGATIVE MG/DL
LEUKOCYTE ESTERASE UR QL STRIP.AUTO: ABNORMAL
MCH RBC QN AUTO: 28 PG (ref 26–34)
MCHC RBC AUTO-ENTMCNC: 30.6 G/DL (ref 32–36)
MCV RBC AUTO: 91 FL (ref 80–100)
MUCOUS THREADS #/AREA URNS AUTO: ABNORMAL /LPF
NITRITE UR QL STRIP.AUTO: NEGATIVE
NRBC BLD-RTO: 0 /100 WBCS (ref 0–0)
PH UR STRIP.AUTO: 6.5 [PH]
PLATELET # BLD AUTO: 214 X10*3/UL (ref 150–450)
POTASSIUM SERPL-SCNC: 4.1 MMOL/L (ref 3.5–5.3)
PROT SERPL-MCNC: 6.7 G/DL (ref 6.4–8.2)
PROT UR STRIP.AUTO-MCNC: NEGATIVE MG/DL
PROTHROMBIN TIME: 11.2 SECONDS (ref 9.8–12.8)
RBC # BLD AUTO: 4.21 X10*6/UL (ref 4–5.2)
RBC # UR STRIP.AUTO: NEGATIVE /UL
RBC #/AREA URNS AUTO: ABNORMAL /HPF
SODIUM SERPL-SCNC: 140 MMOL/L (ref 136–145)
SP GR UR STRIP.AUTO: 1.02
SQUAMOUS #/AREA URNS AUTO: ABNORMAL /HPF
UROBILINOGEN UR STRIP.AUTO-MCNC: NORMAL MG/DL
WBC # BLD AUTO: 6.7 X10*3/UL (ref 4.4–11.3)
WBC #/AREA URNS AUTO: ABNORMAL /HPF

## 2024-10-23 PROCEDURE — 85027 COMPLETE CBC AUTOMATED: CPT

## 2024-10-23 PROCEDURE — 85730 THROMBOPLASTIN TIME PARTIAL: CPT

## 2024-10-23 PROCEDURE — 86900 BLOOD TYPING SEROLOGIC ABO: CPT

## 2024-10-23 PROCEDURE — 80053 COMPREHEN METABOLIC PANEL: CPT

## 2024-10-23 PROCEDURE — 85610 PROTHROMBIN TIME: CPT

## 2024-10-23 PROCEDURE — 81001 URINALYSIS AUTO W/SCOPE: CPT

## 2024-10-23 PROCEDURE — 86850 RBC ANTIBODY SCREEN: CPT

## 2024-10-23 PROCEDURE — 36415 COLL VENOUS BLD VENIPUNCTURE: CPT

## 2024-10-23 PROCEDURE — 87086 URINE CULTURE/COLONY COUNT: CPT

## 2024-10-23 PROCEDURE — 86901 BLOOD TYPING SEROLOGIC RH(D): CPT

## 2024-10-23 RX ORDER — PSYLLIUM HUSK 0.4 G
600 CAPSULE ORAL 2 TIMES DAILY
COMMUNITY

## 2024-10-23 ASSESSMENT — DUKE ACTIVITY SCORE INDEX (DASI)
CAN YOU PARTICIPATE IN STRENOUS SPORTS LIKE SWIMMING, SINGLES TENNIS, FOOTBALL, BASKETBALL, OR SKIING: NO
CAN YOU WALK INDOORS, SUCH AS AROUND YOUR HOUSE: YES
CAN YOU WALK A BLOCK OR TWO ON LEVEL GROUND: YES
CAN YOU DO LIGHT WORK AROUND THE HOUSE LIKE DUSTING OR WASHING DISHES: YES
CAN YOU HAVE SEXUAL RELATIONS: YES
CAN YOU CLIMB A FLIGHT OF STAIRS OR WALK UP A HILL: YES
CAN YOU PARTICIPATE IN MODERATE RECREATIONAL ACTIVITIES LIKE GOLF, BOWLING, DANCING, DOUBLES TENNIS OR THROWING A BASEBALL OR FOOTBALL: YES
CAN YOU DO HEAVY WORK AROUND THE HOUSE LIKE SCRUBBING FLOORS OR LIFTING AND MOVING HEAVY FURNITURE: YES
CAN YOU RUN A SHORT DISTANCE: YES
TOTAL_SCORE: 50.7
CAN YOU DO YARD WORK LIKE RAKING LEAVES, WEEDING OR PUSHING A MOWER: YES
DASI METS SCORE: 9
CAN YOU TAKE CARE OF YOURSELF (EAT, DRESS, BATHE, OR USE TOILET): YES
CAN YOU DO MODERATE WORK AROUND THE HOUSE LIKE VACUUMING, SWEEPING FLOORS OR CARRYING GROCERIES: YES

## 2024-10-23 ASSESSMENT — PAIN SCALES - GENERAL: PAINLEVEL_OUTOF10: 0 - NO PAIN

## 2024-10-23 ASSESSMENT — LIFESTYLE VARIABLES: SMOKING_STATUS: NONSMOKER

## 2024-10-23 ASSESSMENT — PAIN - FUNCTIONAL ASSESSMENT: PAIN_FUNCTIONAL_ASSESSMENT: 0-10

## 2024-10-23 ASSESSMENT — ACTIVITIES OF DAILY LIVING (ADL): ADL_SCORE: 0

## 2024-10-23 NOTE — PREPROCEDURE INSTRUCTIONS
Medication List            Accurate as of October 23, 2024  2:08 PM. Always use your most recent med list.                atorvastatin 20 mg tablet  Commonly known as: Lipitor  Medication Adjustments for Surgery: Take/Use as prescribed     calcium carbonate 600 mg calcium (1,500 mg) tablet  Additional Medication Adjustments for Surgery: Take last dose 7 days before surgery     cholecalciferol 50 MCG (2000 UT) tablet  Commonly known as: Vitamin D-3  Additional Medication Adjustments for Surgery: Take last dose 7 days before surgery     famotidine 20 mg tablet  Commonly known as: Pepcid  Take 1 tablet (20 mg) by mouth 2 times a day for 15 days.  Medication Adjustments for Surgery: Take/Use as prescribed     magnesium oxide 400 mg tablet  Commonly known as: Mag-Ox  Medication Adjustments for Surgery: Take last dose 1 day (24 hours) before surgery                      Home Preoperative Antibacterial Shower with Chlorhexidine gluconate (CHG)     What is a home preoperative antibacterial shower?  This shower is a way of cleaning the skin with a germ killing solution before surgery. The solution contains chlorhexidine gluconate, commonly known as CHG. CHG is a skin cleanser with germ killing ability. Let your doctor know if you are allergic to chlorhexidine.    Why do I need to take a preoperative antibacterial shower?  Skin is not sterile. It is best to try to make your skin as free of germs as possible before surgery. Proper cleansing with a germ killing soap before surgery can lower the number of germs on your skin. This helps to reduce the risk of infection at the surgical site. Following the instructions listed below will help you prepare your skin for surgery.    How do I use the solution?  Begin using your CHG soap the night before and again the morning of your procedure.   Do not shave the day before or day of surgery.  Remove all jewelry until after surgery. Take off rings and take out all body-piercing  jewelry.  Wash your face and hair with normal soap and shampoo before you use the CHG soap.  Apply the CHG solution to a clean wet washcloth. Move away from the water to avoid premature rinsing of the CHG soap as you are applying. Firmly lather your entire body from the neck down. Do not use CHG on your face, eyes, ears, or genitals.   Pay special attention to the area where your incisions will be located.  Do not scrub your skin too hard.  It is important to allow the CHG soap to sit on your skin for 3-5 minutes.  Rinse the solution off your body completely. Do not wash with your normal soap after the CHG soap solution.  Pat yourself dry with a clean, soft towel.  Do not apply powders, lotions or deodorants as these might block how the CHG soap works.   Dress in clean clothing.  Be sure to sleep with clean, freshly laundered sheets.  Be aware that CHG can cause stains on fabric. Rinse your washcloth and other linens that have contact with CHG completely. Use only non-chlorine detergents to launder the items used.              PRE-OPERATIVE INSTRUCTIONS    You will receive notification one business day prior to your procedure to confirm your arrival time. It is important that you answer your phone and/or check your messages during this time. If you do not hear from the surgery center by 5 pm. the day before your procedure, please call [phone].     Please enter the building through the Outpatient entrance and take the elevator off the lobby to the 2nd floor then check in at the Outpatient Surgery desk on the 2nd floor.    INSTRUCTIONS:  Talk to your surgeon for instructions if you should stop your aspirin, blood thinner, or diabetes medicines.  DO NOT take any multivitamins or over the counter supplements for 7-10 days before surgery.  If not being admitted, you must have an adult immediately available to drive you home after surgery. We also highly recommend you have someone stay with you for the entire day and  night of your surgery.  For children having surgery, a parent or legal guardian must accompany them to the surgery center. If this is not possible, please call [phone] to make additional arrangements.  For adults who are unable to consent or make medical decisions for themselves, a legal guardian or Power of  must accompany them to the surgery center. If this is not possible, please call [phone] to make additional arrangements.  Wear comfortable, loose fitting clothing.  All jewelry and piercings must be removed. If you are unable to remove an item or have a dermal piercing, please be sure to tell the nurse when you arrive for surgery.  Nail polish and make-up must be removed.  Avoid smoking or consuming alcohol for 24 hours before surgery.  To help prevent infection, please take a shower/bath and wash your hair the night before and/or morning of surgery (or follow other specific bathing instructions provided).    Preoperative Fasting Guidelines    Why must I stop eating and drinking near surgery time?  With sedation, food or liquid in your stomach can enter your lungs causing serious complications  Increases nausea and vomiting    When do I need to stop eating and drinking before my surgery?  Do not eat any solid food after midnight the night before your surgery/procedure unless otherwise instructed by your surgeon.   You may have up to 13.5 ounces of clear liquid until TWO hours before your instructed arrival time to the hospital.  This includes water, black tea/coffee, (no milk or cream) apple juice, and electrolyte drinks (Gatorade).   You may chew gum until TWO hours before your surgery/procedure      If applicable, notify your surgeons office immediately of any new skin changes that occur to the surgical limb.      If you have any questions or concerns, please call Pre-Admission Testing at [phone].

## 2024-10-23 NOTE — CPM/PAT H&P
CPM/PAT Evaluation       Name: Veronica Keith (Veronica Keith)  /Age: 1950/74 y.o.     In-Person       Chief Complaint: pre op appointment for upcoming urological surgery    HOWIE DAVIS is a 75 yo female who presents to the emergency room back in 2024 with confusion, determined to have urinary tract infection.  Imaging completed with incidental finding of left ureter mass.  She was sent for repeat imaging and biopsy which determined malignancy.  She has met with urologist and treatment options discussed, subsequently she is scheduled for a robotic left distal ureterectomy. Presents to [facility] today for perioperative risk stratification and optimization.  Denies any recent dysuria, hematuria, and any recurrence of UTI. Otherwise denies any recent fever/chills, chest pains or shortness of breath.  Of note, she does share that she had COVID back in 2024 with symptoms lasting for 4 weeks- she was not hospitalized and did not require supplemental oxygen- symptoms resolved.     Past Medical History:   Diagnosis Date    Colon polyp     COVID     symptoms of 4 weeks this past Aug 2024    HL (hearing loss)     Hyperlipidemia     Lupus (systemic lupus erythematosus) (Multi)     Osteopenia     PAD (peripheral artery disease) (CMS-HCC)     PONV (postoperative nausea and vomiting)     Sleep apnea     non compliant with cpap     PCP: Dr. Lashae Roman (in [place])     Past Surgical History:   Procedure Laterality Date    COLONOSCOPY      FOOT SURGERY Bilateral 2013    Foot Surgery    OTHER SURGICAL HISTORY  2024    cystoscopy, transurethral resection of bladder tumor, left ureteral stent, ablation of   left ureteral tumor    OTHER SURGICAL HISTORY Right     knee arthroscopy    TUBAL LIGATION  2013    Tubal Ligation    UPPER GASTROINTESTINAL ENDOSCOPY         Patient Sexual activity questions deferred to the physician.    Family History   Problem Relation Name Age of Onset    Heart disease Mother       Primary care provider is aware of situation below, states patient cannot be worked into schedule today.    Called mom and informed her of this, mom verbalized understanding. Writer offered another clinic, mom states she would prefer that patient be seen by primary care provider or another provider in this clinic, writer explained that patient cannot be worked in, no other providers in clinic today. Mom states she will bring patient to urgent care.      Cancer Mother      Hypertension Mother      Hypertension Father      Hypertension Brother         Allergies   Allergen Reactions    Demerol [Meperidine] Headache and Nausea/vomiting    Grass Pollen Runny nose    House Dust Runny nose    Medrol [Methylprednisolone] Other     flushing       Prior to Admission medications    Medication Sig Start Date End Date Taking? Authorizing Provider   atorvastatin (Lipitor) 20 mg tablet Take 1 tablet (20 mg) by mouth once daily.    Historical Provider, MD   calcium carbonate (CALCIUM 600 ORAL) Take 1 tablet by mouth once daily.    Historical Provider, MD   cholecalciferol (Vitamin D-3) 50 MCG (2000 UT) tablet Take 1 tablet (50 mcg) by mouth once daily.    Historical Provider, MD   famotidine (Pepcid) 20 mg tablet Take 1 tablet (20 mg) by mouth 2 times a day for 15 days. 8/27/24 9/12/24  Danny Plata DO   magnesium oxide (Mag-Ox) 400 mg tablet Take 1 tablet (400 mg) by mouth once daily.    Historical Provider, MD   saccharomyces boulardii (Florastor) 250 mg capsule Take 1 capsule (250 mg) by mouth once daily.    Historical Provider, MD JENNY LIU   Constitutional: Negative for fever, chills, or sweats   ENMT: Negative for nasal discharge, congestion, ear pain, mouth pain, throat pain   Respiratory: Negative for cough, wheezing, shortness of breath   Cardiac: Negative for chest pain, dyspnea on exertion, palpitations   Gastrointestinal: Negative for nausea, vomiting, diarrhea, constipation, abdominal pain    Genitourinary: Negative for dysuria, flank pain, frequency, hematuria  + Known left ureter mass     Musculoskeletal: Negative for decreased ROM, pain, swelling, weakness   Neurological: Negative for dizziness, confusion, headache  Psychiatric: Negative for mood changes   Skin: Negative for itching, rash, ulcer    Hematologic/Lymph: Negative for bruising, easy bleeding  Allergic/Immunologic: Negative itching, sneezing, swelling      Physical Exam  Constitutional:        Appearance: Normal appearance.   HENT:      Head: Normocephalic.      Mouth/Throat:      Mouth: Mucous membranes are moist.   Eyes:      Extraocular Movements: Extraocular movements intact.   Cardiovascular:      Rate and Rhythm: Normal rate and regular rhythm.   Pulmonary:      Effort: Pulmonary effort is normal.      Breath sounds: Normal breath sounds.   Abdominal:      General: Abdomen is flat.      Palpations: Abdomen is soft.   Musculoskeletal:         General: Normal range of motion.      Cervical back: Normal range of motion.   Skin:     General: Skin is warm and dry.   Neurological:      General: No focal deficit present.      Mental Status: She is alert.   Psychiatric:         Mood and Affect: Mood normal.          PAT AIRWAY:   Airway:     Neck ROM::  Full  normal        Visit Vitals  /63   Pulse 65   Temp 37 °C (98.6 °F)   Resp 16       DASI Risk Score      Flowsheet Row Pre-Admission Testing from 10/23/2024 in [facility]   Can you take care of yourself (eat, dress, bathe, or use toilet)?  2.75 filed at 10/23/2024 1354   Can you walk indoors, such as around your house? 1.75 filed at 10/23/2024 1354   Can you walk a block or two on level ground?  2.75 filed at 10/23/2024 1354   Can you climb a flight of stairs or walk up a hill? 5.5 filed at 10/23/2024 1354   Can you run a short distance? 8 filed at 10/23/2024 1354   Can you do light work around the house like dusting or washing dishes? 2.7 filed at 10/23/2024 1354   Can you do moderate work around the house like vacuuming, sweeping floors or carrying groceries? 3.5 filed at 10/23/2024 1354   Can you do heavy work around the house like scrubbing floors or lifting and moving heavy furniture?  8 filed at 10/23/2024 1354   Can you do yard work like raking leaves, weeding or pushing a mower? 4.5 filed at 10/23/2024 1354   Can you have sexual relations? 5.25 filed at 10/23/2024 1354   Can you participate in moderate recreational  activities like golf, bowling, dancing, doubles tennis or throwing a baseball or football? 6 filed at 10/23/2024 1354   Can you participate in strenous sports like swimming, singles tennis, football, basketball, or skiing? 0 filed at 10/23/2024 1354   DASI SCORE 50.7 filed at 10/23/2024 1354   METS Score (Will be calculated only when all the questions are answered) 9 filed at 10/23/2024 1354          Caprini DVT Assessment      Flowsheet Row Pre-Admission Testing from 10/23/2024 in [facility] to Hosp-Admission (Discharged) from 7/16/2024 in [facility] Observation with Rudolph Renae DO, Rudolph Irwin DO and Jorge Morgan MD   DVT Score 6 filed at 10/23/2024 1353 5 filed at 07/16/2024 1509   Medical Factors Present cancer, chemotherapy, or previous malignancy filed at 10/23/2024 1353 --   Surgical Factors Major surgery planned, lasting over 3 hours filed at 10/23/2024 1353 --   BMI -- 31-40 (Obesity) filed at 07/16/2024 1509   RETIRED: Age -- 60-75 years filed at 07/16/2024 1509          Modified Frailty Index      Flowsheet Row Pre-Admission Testing from 10/23/2024 in [facility]   Non-independent functional status (problems with dressing, bathing, personal grooming, or cooking) 0 filed at 10/23/2024 1355   History of diabetes mellitus  0 filed at 10/23/2024 1355   History of COPD 0 filed at 10/23/2024 1355   History of CHF No filed at 10/23/2024 1355   History of MI 0 filed at 10/23/2024 1355   History of Percutaneous Coronary Intervention, Cardiac Surgery, or Angina No filed at 10/23/2024 1355   Hypertension requiring the use of medication  0 filed at 10/23/2024 1355   Peripheral vascular disease 0 filed at 10/23/2024 1355   Impaired sensorium (cognitive impairement or loss, clouding, or delirium) 0 filed at 10/23/2024 1355   TIA or CVA withouy residual deficit 0 filed at 10/23/2024 1355   Cerebrovascular accident with deficit 0 filed at 10/23/2024 1351    Modified Frailty Index Calculator 0 filed at 10/23/2024 1353          CHADS2 Stroke Risk         N/A 3 to 100%: High Risk   2 to < 3%: Medium Risk   0 to < 2%: Low Risk     Last Change: N/A          This score determines the patient's risk of having a stroke if the patient has atrial fibrillation.        This score is not applicable to this patient. Components are not calculated.          Revised Cardiac Risk Index    No data to display       Apfel Simplified Score      Flowsheet Row Pre-Admission Testing from 10/23/2024 in [facility]   Smoking status 1 filed at 10/23/2024 1351   History of motion sickness or PONV  0 filed at 10/23/2024 1355   Gender - Female 1=Yes filed at 10/23/2024 1355          Risk Analysis Index Results This Encounter         10/23/2024  1355             Do you live in a place other than your own home?: 0    When did you begin living in the place you are currently residing?: Greater than one year ago    Any kidney failure, kidney not working well, or seeing a kidney doctor (nephrologist)? If yes, was this for kidney stones or another problem?: 0 No    Any history of chronic (long-term) congestive heart failure (CHF)?: 0 No    Any shortness of breath when resting?: 0 No    In the past five years, have you been diagnosed with or treated for cancer?: Yes    During the last 3 months has it become difficult for you to remember things or organize your thoughts?: 0 No    Have you lost weight of 10 pounds or more in the past 3 months without trying?: 0 No    Do you have any loss of appetitie?: 0 No    Getting Around (Mobility): 0 Can get around without help    Eatin Can plan and prepare own meals    Toiletin Can use toilet without any help    Personal Hygiene (Bathing, Hand Washing, Changing Clothes): 0 Can shower or bathe without any help    NIELSON Cancer History: Patient indicates history of cancer    Total Risk Analysis Index Score Without Cancer: 22    Total Risk Analysis  Index Score: 34          Stop Bang Score      Flowsheet Row Pre-Admission Testing from 10/23/2024 in [facility] Admission (Discharged) from 9/12/2024 in [facility] OR with Issa Boyd MD   Do you snore loudly? 1 filed at 10/23/2024 1354 1 filed at 09/12/2024 1247   Do you often feel tired or fatigued after your sleep? 1 filed at 10/23/2024 1354 1 filed at 09/12/2024 1247   Has anyone ever observed you stop breathing in your sleep? 1 filed at 10/23/2024 1354 1 filed at 09/12/2024 1247   Do you have or are you being treated for high blood pressure? 0 filed at 10/23/2024 1354 0 filed at 09/12/2024 1247   Recent BMI (Calculated) 25.2 filed at 10/23/2024 1354 24.5 filed at 09/12/2024 1247   Is BMI greater than 35 kg/m2? 0=No filed at 10/23/2024 1354 0=No filed at 09/12/2024 1247   Age older than 50 years old? 1=Yes filed at 10/23/2024 1354 1=Yes filed at 09/12/2024 1247   Is your neck circumference greater than 17 inches (Male) or 16 inches (Female)? 0 filed at 10/23/2024 1354 --   Gender - Male 0=No filed at 10/23/2024 1354 0=No filed at 09/12/2024 1247   STOP-BANG Total Score 4 filed at 10/23/2024 1354 --          Prodigy: High Risk  Total Score: 12              Prodigy Age Score           ARISCAT Score for Postoperative Pulmonary Complications      Flowsheet Row Pre-Admission Testing from 10/23/2024 in [facility]   Age, years  3 filed at 10/23/2024 1355   Preoperative SpO2 0 filed at 10/23/2024 1355   Respiratory infection in the last month Either upper or lower (i.e., URI, bronchitis, pneumonia), with fever and antibiotic treatment 0 filed at 10/23/2024 1355   Preoperative anemoa (Hgb less than 10 g/dl) 0 filed at 10/23/2024 1355   Surgical incision  15 filed at 10/23/2024 1355   Duration of surgery  23 filed at 10/23/2024 1355   Emergency Procedure  0 filed at 10/23/2024 1355   ARISCAT Total Score  35 filed at 10/23/2024 1355          Julee Perioperative Risk for  Myocardial Infarction or Cardiac Arrest (LEYDI)      Flowsheet Row Pre-Admission Testing from 10/23/2024 in [facility]   Age 1.46 filed at 10/23/2024 1356   Functional Status  0 filed at 10/23/2024 1356   ASA Class  -5.17 filed at 10/23/2024 1356   Creatinine 0 filed at 10/23/2024 1356   Type of Procedure  -0.26 filed at 10/23/2024 1356   LEYDI Total Score  -5.43 filed at 10/23/2024 1356            Assessment and Plan:     Pre-Op  74-year-old female scheduled for robotic left distal ureterectomy, left ureteroscopy with cystoscopy with insertion stent ureter on 2024 with Dr. Boyd.  Blood work and urine culture ordered.  Previous EKG on file from 2024, normal sinus rhythm. Otherwise no further orders indicated.     Cardiac  -Hyperlipidemia, compliant with statin  -Peripheral arterial disease  DASI Score: 50.7   MET Score: 9  RCRI  >/=3 which is 15% 30 day risk of MACE (risk for cardiac death, nonfatal myocardial infarction, and nonfactal cardiac arrest)  LEYDI score which indicates a   0 % risk of intraoperative or 30-day postoperative MACE    Pulmonary  Preoperative deep breathing educational handout provided to patient.  STOP BAN points which is a intermediate risk for moderate to severe RADHA  ARISCAT:    35   points which is a intermediate (13.3%) risk of in-hospital post-op pulmonary complications     Neuro  No neurologic diagnoses, however, the patient is at an increased risk for post operative delirium secondary to age >/= 65 and type and duration of surgery.  Preoperative brain exercise educational handout provided to patient.    The patient is at an increased risk for perioperative stroke secondary to increased age, HLD, female sex , general anesthesia, and op time >2.5 hours.    GI  GERD, compliant with taking Pepcid  Apfel: 3 points 61% risk for post operative N/V    /Renal  -Malignant neoplasm of left ureter, reason for surgery  Counseled on avoiding NSAIDs, adequate  hydration    Musculoskeletal   Osteoarthritis, bilateral knees    Hematologic/ Immuno  -Lupus, in remission, no current treatment or symptoms  -due to high Caprini score of 6 patient is at HIGH risk for perioperative DVT, informative education handout provided    Skin check: Patient was instructed to make surgeon aware of any skin changes/concerns prior to surgery.     Anesthesia:  Patient endorses PONV, she thinks from Demerol   -Recently underwent left ureteroscopy with biopsy and HOLMIUM laser fulguration (Left) on 9/12/2024, no acute anesthesia events documented  ASA 2    See risk scores as previously documented.

## 2024-10-23 NOTE — PREPROCEDURE INSTRUCTIONS
Medication List            Accurate as of October 23, 2024  1:59 PM. Always use your most recent med list.                atorvastatin 20 mg tablet  Commonly known as: Lipitor  Medication Adjustments for Surgery: Take/Use as prescribed     calcium carbonate 600 mg calcium (1,500 mg) tablet     cholecalciferol 50 MCG (2000 UT) tablet  Commonly known as: Vitamin D-3     famotidine 20 mg tablet  Commonly known as: Pepcid  Take 1 tablet (20 mg) by mouth 2 times a day for 15 days.  Medication Adjustments for Surgery: Take/Use as prescribed     magnesium oxide 400 mg tablet  Commonly known as: Mag-Ox  Medication Adjustments for Surgery: Take last dose 1 day (24 hours) before surgery                        Home Preoperative Antibacterial Shower with Chlorhexidine gluconate (CHG)     What is a home preoperative antibacterial shower?  This shower is a way of cleaning the skin with a germ killing solution before surgery. The solution contains chlorhexidine gluconate, commonly known as CHG. CHG is a skin cleanser with germ killing ability. Let your doctor know if you are allergic to chlorhexidine.    Why do I need to take a preoperative antibacterial shower?  Skin is not sterile. It is best to try to make your skin as free of germs as possible before surgery. Proper cleansing with a germ killing soap before surgery can lower the number of germs on your skin. This helps to reduce the risk of infection at the surgical site. Following the instructions listed below will help you prepare your skin for surgery.    How do I use the solution?  Begin using your CHG soap the night before and again the morning of your procedure.   Do not shave the day before or day of surgery.  Remove all jewelry until after surgery. Take off rings and take out all body-piercing jewelry.  Wash your face and hair with normal soap and shampoo before you use the CHG soap.  Apply the CHG solution to a clean wet washcloth. Move away from the water to avoid  premature rinsing of the CHG soap as you are applying. Firmly lather your entire body from the neck down. Do not use CHG on your face, eyes, ears, or genitals.   Pay special attention to the area where your incisions will be located.  Do not scrub your skin too hard.  It is important to allow the CHG soap to sit on your skin for 3-5 minutes.  Rinse the solution off your body completely. Do not wash with your normal soap after the CHG soap solution.  Pat yourself dry with a clean, soft towel.  Do not apply powders, lotions or deodorants as these might block how the CHG soap works.   Dress in clean clothing.  Be sure to sleep with clean, freshly laundered sheets.  Be aware that CHG can cause stains on fabric. Rinse your washcloth and other linens that have contact with CHG completely. Use only non-chlorine detergents to launder the items used.          PRE-OPERATIVE INSTRUCTIONS    You will receive notification one business day prior to your procedure to confirm your arrival time. It is important that you answer your phone and/or check your messages during this time. If you do not hear from the surgery center by 5 pm. the day before your procedure, please call [phone].     Please enter the building through the Outpatient entrance and take the elevator off the lobby to the 2nd floor then check in at the Outpatient Surgery desk on the 2nd floor.    INSTRUCTIONS:  Talk to your surgeon for instructions if you should stop your aspirin, blood thinner, or diabetes medicines.  DO NOT take any multivitamins or over the counter supplements for 7-10 days before surgery.  If not being admitted, you must have an adult immediately available to drive you home after surgery. We also highly recommend you have someone stay with you for the entire day and night of your surgery.  For children having surgery, a parent or legal guardian must accompany them to the surgery center. If this is not possible, please call [phone] to  make additional arrangements.  For adults who are unable to consent or make medical decisions for themselves, a legal guardian or Power of  must accompany them to the surgery center. If this is not possible, please call [phone] to make additional arrangements.  Wear comfortable, loose fitting clothing.  All jewelry and piercings must be removed. If you are unable to remove an item or have a dermal piercing, please be sure to tell the nurse when you arrive for surgery.  Nail polish and make-up must be removed.  Avoid smoking or consuming alcohol for 24 hours before surgery.  To help prevent infection, please take a shower/bath and wash your hair the night before and/or morning of surgery (or follow other specific bathing instructions provided).    Preoperative Fasting Guidelines    Why must I stop eating and drinking near surgery time?  With sedation, food or liquid in your stomach can enter your lungs causing serious complications  Increases nausea and vomiting    When do I need to stop eating and drinking before my surgery?  Do not eat any solid food after midnight the night before your surgery/procedure unless otherwise instructed by your surgeon.   You may have up to 13.5 ounces of clear liquid until TWO hours before your instructed arrival time to the hospital.  This includes water, black tea/coffee, (no milk or cream) apple juice, and electrolyte drinks (Gatorade).   You may chew gum until TWO hours before your surgery/procedure      If applicable, notify your surgeons office immediately of any new skin changes that occur to the surgical limb.      If you have any questions or concerns, please call Pre-Admission Testing at [phone].

## 2024-10-23 NOTE — H&P (VIEW-ONLY)
CPM/PAT Evaluation       Name: Veronica Keith (Veronica Keith)  /Age: 1950/74 y.o.     In-Person       Chief Complaint: pre op appointment for upcoming urological surgery    HOWIE DAVIS is a 73 yo female who presents to the emergency room back in 2024 with confusion, determined to have urinary tract infection.  Imaging completed with incidental finding of left ureter mass.  She was sent for repeat imaging and biopsy which determined malignancy.  She has met with urologist and treatment options discussed, subsequently she is scheduled for a robotic left distal ureterectomy. Presents to [facility] today for perioperative risk stratification and optimization.  Denies any recent dysuria, hematuria, and any recurrence of UTI. Otherwise denies any recent fever/chills, chest pains or shortness of breath.  Of note, she does share that she had COVID back in 2024 with symptoms lasting for 4 weeks- she was not hospitalized and did not require supplemental oxygen- symptoms resolved.     Past Medical History:   Diagnosis Date    Colon polyp     COVID     symptoms of 4 weeks this past Aug 2024    HL (hearing loss)     Hyperlipidemia     Lupus (systemic lupus erythematosus) (Multi)     Osteopenia     PAD (peripheral artery disease) (CMS-HCC)     PONV (postoperative nausea and vomiting)     Sleep apnea     non compliant with cpap     PCP: Dr. Lashae Roman (in [place])     Past Surgical History:   Procedure Laterality Date    COLONOSCOPY      FOOT SURGERY Bilateral 2013    Foot Surgery    OTHER SURGICAL HISTORY  2024    cystoscopy, transurethral resection of bladder tumor, left ureteral stent, ablation of   left ureteral tumor    OTHER SURGICAL HISTORY Right     knee arthroscopy    TUBAL LIGATION  2013    Tubal Ligation    UPPER GASTROINTESTINAL ENDOSCOPY         Patient Sexual activity questions deferred to the physician.    Family History   Problem Relation Name Age of Onset    Heart disease Mother       Cancer Mother      Hypertension Mother      Hypertension Father      Hypertension Brother         Allergies   Allergen Reactions    Demerol [Meperidine] Headache and Nausea/vomiting    Grass Pollen Runny nose    House Dust Runny nose    Medrol [Methylprednisolone] Other     flushing       Prior to Admission medications    Medication Sig Start Date End Date Taking? Authorizing Provider   atorvastatin (Lipitor) 20 mg tablet Take 1 tablet (20 mg) by mouth once daily.    Historical Provider, MD   calcium carbonate (CALCIUM 600 ORAL) Take 1 tablet by mouth once daily.    Historical Provider, MD   cholecalciferol (Vitamin D-3) 50 MCG (2000 UT) tablet Take 1 tablet (50 mcg) by mouth once daily.    Historical Provider, MD   famotidine (Pepcid) 20 mg tablet Take 1 tablet (20 mg) by mouth 2 times a day for 15 days. 8/27/24 9/12/24  Danny Plata DO   magnesium oxide (Mag-Ox) 400 mg tablet Take 1 tablet (400 mg) by mouth once daily.    Historical Provider, MD   saccharomyces boulardii (Florastor) 250 mg capsule Take 1 capsule (250 mg) by mouth once daily.    Historical Provider, MD JENNY LIU   Constitutional: Negative for fever, chills, or sweats   ENMT: Negative for nasal discharge, congestion, ear pain, mouth pain, throat pain   Respiratory: Negative for cough, wheezing, shortness of breath   Cardiac: Negative for chest pain, dyspnea on exertion, palpitations   Gastrointestinal: Negative for nausea, vomiting, diarrhea, constipation, abdominal pain    Genitourinary: Negative for dysuria, flank pain, frequency, hematuria  + Known left ureter mass     Musculoskeletal: Negative for decreased ROM, pain, swelling, weakness   Neurological: Negative for dizziness, confusion, headache  Psychiatric: Negative for mood changes   Skin: Negative for itching, rash, ulcer    Hematologic/Lymph: Negative for bruising, easy bleeding  Allergic/Immunologic: Negative itching, sneezing, swelling      Physical Exam  Constitutional:        Appearance: Normal appearance.   HENT:      Head: Normocephalic.      Mouth/Throat:      Mouth: Mucous membranes are moist.   Eyes:      Extraocular Movements: Extraocular movements intact.   Cardiovascular:      Rate and Rhythm: Normal rate and regular rhythm.   Pulmonary:      Effort: Pulmonary effort is normal.      Breath sounds: Normal breath sounds.   Abdominal:      General: Abdomen is flat.      Palpations: Abdomen is soft.   Musculoskeletal:         General: Normal range of motion.      Cervical back: Normal range of motion.   Skin:     General: Skin is warm and dry.   Neurological:      General: No focal deficit present.      Mental Status: She is alert.   Psychiatric:         Mood and Affect: Mood normal.          PAT AIRWAY:   Airway:     Neck ROM::  Full  normal        Visit Vitals  /63   Pulse 65   Temp 37 °C (98.6 °F)   Resp 16       DASI Risk Score      [place]sheet [place] [facility] [place]   Can you take care of yourself (eat, dress, bathe, or use toilet)?  2.75 filed at 10/23/2024 1354   Can you walk indoors, such as around your house? 1.75 filed at 10/23/2024 1354   Can you walk a block or two on level ground?  2.75 filed at 10/23/2024 1354   Can you climb a flight of stairs or walk up a hill? 5.5 filed at 10/23/2024 1354   Can you run a short distance? 8 filed at 10/23/2024 1354   Can you do light work around the house like dusting or washing dishes? 2.7 filed at 10/23/2024 1354   Can you do moderate work around the house like vacuuming, sweeping floors or carrying groceries? 3.5 filed at 10/23/2024 1354   Can you do heavy work around the house like scrubbing floors or lifting and moving heavy furniture?  8 filed at 10/23/2024 1354   Can you do yard work like raking leaves, weeding or pushing a mower? 4.5 filed at 10/23/2024 1354   Can you have sexual relations? 5.25 filed at 10/23/2024 1354   Can you participate in moderate recreational  activities like golf, bowling, dancing, doubles tennis or throwing a baseball or football? 6 filed at 10/23/2024 1354   Can you participate in strenous sports like swimming, singles tennis, football, basketball, or skiing? 0 filed at 10/23/2024 1354   DASI SCORE 50.7 filed at 10/23/2024 1354   METS Score (Will be calculated only when all the questions are answered) 9 filed at 10/23/2024 1354          Caprini DVT Assessment      Flowsheet Row Pre-Admission Testing from 10/23/2024 in [facility] ED to Hosp-Admission (Discharged) from 7/16/2024 in [facility] 3 South Observation with Rudolph Renae DO, Rudolph Irwin DO and Jorge Morgan MD   DVT Score 6 filed at 10/23/2024 1353 5 filed at 07/16/2024 1509   Medical Factors Present cancer, chemotherapy, or previous malignancy filed at 10/23/2024 1353 --   Surgical Factors Major surgery planned, lasting over 3 hours filed at 10/23/2024 1353 --   BMI -- 31-40 (Obesity) filed at 07/16/2024 1509   RETIRED: Age -- 60-75 years filed at 07/16/2024 1509          Modified Frailty Index      Flowsheet Row Pre-Admission Testing from 10/23/2024 in [facility]   Non-independent functional status (problems with dressing, bathing, personal grooming, or cooking) 0 filed at 10/23/2024 1355   History of diabetes mellitus  0 filed at 10/23/2024 1355   History of COPD 0 filed at 10/23/2024 1355   History of CHF No filed at 10/23/2024 1355   History of MI 0 filed at 10/23/2024 1355   History of Percutaneous Coronary Intervention, Cardiac Surgery, or Angina No filed at 10/23/2024 1355   Hypertension requiring the use of medication  0 filed at 10/23/2024 1355   Peripheral vascular disease 0 filed at 10/23/2024 1355   Impaired sensorium (cognitive impairement or loss, clouding, or delirium) 0 filed at 10/23/2024 1355   TIA or CVA withouy residual deficit 0 filed at 10/23/2024 1355   Cerebrovascular accident with deficit 0 filed at 10/23/2024 1358    Modified Frailty Index Calculator 0 filed at 10/23/2024 1354          CHADS2 Stroke Risk         N/A 3 to 100%: High Risk   2 to < 3%: Medium Risk   0 to < 2%: Low Risk     Last Change: N/A          This score determines the patient's risk of having a stroke if the patient has atrial fibrillation.        This score is not applicable to this patient. Components are not calculated.          Revised Cardiac Risk Index    No data to display       Apfel Simplified Score      Flowsheet Row Pre-Admission Testing from 10/23/2024 in [facility]   Smoking status 1 filed at 10/23/2024 1359   History of motion sickness or PONV  0 filed at 10/23/2024 1355   Gender - Female 1=Yes filed at 10/23/2024 1355          Risk Analysis Index Results This Encounter         10/23/2024  1355             Do you live in a place other than your own home?: 0    When did you begin living in the place you are currently residing?: Greater than one year ago    Any kidney failure, kidney not working well, or seeing a kidney doctor (nephrologist)? If yes, was this for kidney stones or another problem?: 0 No    Any history of chronic (long-term) congestive heart failure (CHF)?: 0 No    Any shortness of breath when resting?: 0 No    In the past five years, have you been diagnosed with or treated for cancer?: Yes    During the last 3 months has it become difficult for you to remember things or organize your thoughts?: 0 No    Have you lost weight of 10 pounds or more in the past 3 months without trying?: 0 No    Do you have any loss of appetitie?: 0 No    Getting Around (Mobility): 0 Can get around without help    Eatin Can plan and prepare own meals    Toiletin Can use toilet without any help    Personal Hygiene (Bathing, Hand Washing, Changing Clothes): 0 Can shower or bathe without any help    NIELSON Cancer History: Patient indicates history of cancer    Total Risk Analysis Index Score Without Cancer: 22    Total Risk Analysis  Index Score: 34          Stop Bang Score      Flowsheet Row Pre-Admission Testing from 10/23/2024 in [facility] (Discharged) from 9/12/2024 in [facility] OR with Issa Boyd MD   Do you snore loudly? 1 filed at 10/23/2024 1354 1 filed at 09/12/2024 1247   Do you often feel tired or fatigued after your sleep? 1 filed at 10/23/2024 1354 1 filed at 09/12/2024 1247   Has anyone ever observed you stop breathing in your sleep? 1 filed at 10/23/2024 1354 1 filed at 09/12/2024 1247   Do you have or are you being treated for high blood pressure? 0 filed at 10/23/2024 1354 0 filed at 09/12/2024 1247   Recent BMI (Calculated) 25.2 filed at 10/23/2024 1354 24.5 filed at 09/12/2024 1247   Is BMI greater than 35 kg/m2? 0=No filed at 10/23/2024 1354 0=No filed at 09/12/2024 1247   Age older than 50 years old? 1=Yes filed at 10/23/2024 1354 1=Yes filed at 09/12/2024 1247   Is your neck circumference greater than 17 inches (Male) or 16 inches (Female)? 0 filed at 10/23/2024 1354 --   Gender - Male 0=No filed at 10/23/2024 1354 0=No filed at 09/12/2024 1247   STOP-BANG Total Score 4 filed at 10/23/2024 1354 --          Prodigy: High Risk  Total Score: 12              Prodigy Age Score           ARISCAT Score for Postoperative Pulmonary Complications      Flowsheet Row Pre-Admission Testing from 10/23/2024 in [facility]   Age, years  3 filed at 10/23/2024 1355   Preoperative SpO2 0 filed at 10/23/2024 1355   Respiratory infection in the last month Either upper or lower (i.e., URI, bronchitis, pneumonia), with fever and antibiotic treatment 0 filed at 10/23/2024 1355   Preoperative anemoa (Hgb less than 10 g/dl) 0 filed at 10/23/2024 1355   Surgical incision  15 filed at 10/23/2024 1355   Duration of surgery  23 filed at 10/23/2024 1355   Emergency Procedure  0 filed at 10/23/2024 1355   ARISCAT Total Score  35 filed at 10/23/2024 1355          Julee Perioperative Risk for  Myocardial Infarction or Cardiac Arrest (LEYDI)      Flowsheet Row Pre-Admission Testing from 10/23/2024 in [facility]   Age 1.46 filed at 10/23/2024 1356   Functional Status  0 filed at 10/23/2024 1356   ASA Class  -5.17 filed at 10/23/2024 1356   Creatinine 0 filed at 10/23/2024 1356   Type of Procedure  -0.26 filed at 10/23/2024 1356   LEYDI Total Score  -5.43 filed at 10/23/2024 1356            Assessment and Plan:     Pre-Op  74-year-old female scheduled for robotic left distal ureterectomy, left ureteroscopy with cystoscopy with insertion stent ureter on 2024 with Dr. Boyd.  Blood work and urine culture ordered.  Previous EKG on file from 2024, normal sinus rhythm. Otherwise no further orders indicated.     Cardiac  -Hyperlipidemia, compliant with statin  -Peripheral arterial disease  DASI Score: 50.7   MET Score: 9  RCRI  >/=3 which is 15% 30 day risk of MACE (risk for cardiac death, nonfatal myocardial infarction, and nonfactal cardiac arrest)  LEYDI score which indicates a   0 % risk of intraoperative or 30-day postoperative MACE    Pulmonary  Preoperative deep breathing educational handout provided to patient.  STOP BAN points which is a intermediate risk for moderate to severe RADHA  ARISCAT:    35   points which is a intermediate (13.3%) risk of in-hospital post-op pulmonary complications     Neuro  No neurologic diagnoses, however, the patient is at an increased risk for post operative delirium secondary to age >/= 65 and type and duration of surgery.  Preoperative brain exercise educational handout provided to patient.    The patient is at an increased risk for perioperative stroke secondary to increased age, HLD, female sex , general anesthesia, and op time >2.5 hours.    GI  GERD, compliant with taking Pepcid  Apfel: 3 points 61% risk for post operative N/V    /Renal  -Malignant neoplasm of left ureter, reason for surgery  Counseled on avoiding NSAIDs, adequate  hydration    Musculoskeletal   Osteoarthritis, bilateral knees    Hematologic/ Immuno  -Lupus, in remission, no current treatment or symptoms  -due to high Caprini score of 6 patient is at HIGH risk for perioperative DVT, informative education handout provided    Skin check: Patient was instructed to make surgeon aware of any skin changes/concerns prior to surgery.     Anesthesia:  Patient endorses PONV, she thinks from Demerol   -Recently underwent left ureteroscopy with biopsy and HOLMIUM laser fulguration (Left) on 9/12/2024, no acute anesthesia events documented  ASA 2    See risk scores as previously documented.

## 2024-10-23 NOTE — PREPROCEDURE INSTRUCTIONS
Thank you for visiting Preadmission Testing at [facility]. If you have any changes to your health condition, please call the SURGEON's office to alert them and give them details of your symptoms.        Preoperative Brain Exercises    What are brain exercises?  A brain exercise is any activity that engages your thinking (cognitive) skills.    What types of activities are considered brain exercises?  Jigsaw puzzles, crossword puzzles, word jumble, memory games, word search, and many more.  Many can be found free online or on your phone via a mobile damon.    Why should I do brain exercises before my surgery?  More recent research has shown brain exercise before surgery can lower the risk of postoperative delirium (confusion) which can be especially important for older adults.  Patients who did brain exercises for 5 to 10 hours the days before surgery, cut their risk of postoperative delirium in half up to 1 week after surgery.      Preoperative Deep Breathing Exercises    Why it is important to do deep breathing exercises before my surgery?  Deep breathing exercises strengthen your breathing muscles.  This helps you to recover after your surgery and decreases the chance of breathing complications.    How are the deep breathing exercises done?  Sit straight with your back supported.  Breathe in deeply and slowly through your nose. Your lower rib cage should expand and your abdomen may move forward.  Hold that breath for 3 to 5 seconds.  Breathe out through pursed lips, slowly and completely.  Rest and repeat 10 times every hour while awake.  Rest longer if you become dizzy or lightheaded.      Patient and Family Education   Ways You Can Help Prevent Blood Clots     This handout explains some simple things you can do to help prevent blood clots.      Blood clots are blockages that can form in the body's veins. When a blood clot forms in your deep veins, it may be called a deep vein thrombosis, or DVT for short. Blood clots can  happen in any part of the body where blood flows, but they are most common in the arms and legs. If a piece of a blood clot breaks free and travels to the lungs, it is called a pulmonary embolus (PE). A PE can be a very serious problem.      Being in the hospital or having surgery can raise your chances of getting a blood clot because you may not be well enough to move around as much as you normally do.      Ways you can help prevent blood clots in the hospital         Wearing SCDs. SCDs stands for Sequential Compression Devices.   SCDs are special sleeves that wrap around your legs  They attach to a pump that fills them with air to gently squeeze your legs every few minutes.   This helps return the blood in your legs to your heart.   SCDs should only be taken off when walking or bathing.   SCDs may not be comfortable, but they can help save your life.               Wearing compression stockings - if your doctor orders them. These special snug fitting stockings gently squeeze your legs to help blood flow.       Walking. Walking helps move the blood in your legs.   If your doctor says it is ok, try walking the halls at least   5 times a day. Ask us to help you get up, so you don't fall.      Taking any blood thinning medicines your doctor orders.          ©[facility]; 3/23        Ways you can help prevent blood clots at home       Wearing compression stockings - if your doctor orders them. ? Walking - to help move the blood in your legs.       Taking any blood thinning medicines your doctor orders.      Signs of a blood clot or PE      Tell your doctor or nurse know right away if you have of the problems listed below.    If you are at home, seek medical care right away. Call 911 for chest pain or problems breathing.          Signs of a blood clot (DVT) - such as pain,  swelling, redness or warmth in your arm or leg      Signs of a pulmonary embolism (PE) - such as chest     pain or feeling short of breath

## 2024-10-23 NOTE — PREPROCEDURE INSTRUCTIONS
Medication List            Accurate as of October 23, 2024  2:04 PM. Always use your most recent med list.                atorvastatin 20 mg tablet  Commonly known as: Lipitor  Medication Adjustments for Surgery: Take/Use as prescribed     calcium carbonate 600 mg calcium (1,500 mg) tablet     cholecalciferol 50 MCG (2000 UT) tablet  Commonly known as: Vitamin D-3     famotidine 20 mg tablet  Commonly known as: Pepcid  Take 1 tablet (20 mg) by mouth 2 times a day for 15 days.  Medication Adjustments for Surgery: Take/Use as prescribed     magnesium oxide 400 mg tablet  Commonly known as: Mag-Ox  Medication Adjustments for Surgery: Take last dose 1 day (24 hours) before surgery                        Home Preoperative Antibacterial Shower with Chlorhexidine gluconate (CHG)     What is a home preoperative antibacterial shower?  This shower is a way of cleaning the skin with a germ killing solution before surgery. The solution contains chlorhexidine gluconate, commonly known as CHG. CHG is a skin cleanser with germ killing ability. Let your doctor know if you are allergic to chlorhexidine.    Why do I need to take a preoperative antibacterial shower?  Skin is not sterile. It is best to try to make your skin as free of germs as possible before surgery. Proper cleansing with a germ killing soap before surgery can lower the number of germs on your skin. This helps to reduce the risk of infection at the surgical site. Following the instructions listed below will help you prepare your skin for surgery.    How do I use the solution?  Begin using your CHG soap the night before and again the morning of your procedure.   Do not shave the day before or day of surgery.  Remove all jewelry until after surgery. Take off rings and take out all body-piercing jewelry.  Wash your face and hair with normal soap and shampoo before you use the CHG soap.  Apply the CHG solution to a clean wet washcloth. Move away from the water to avoid  premature rinsing of the CHG soap as you are applying. Firmly lather your entire body from the neck down. Do not use CHG on your face, eyes, ears, or genitals.   Pay special attention to the area where your incisions will be located.  Do not scrub your skin too hard.  It is important to allow the CHG soap to sit on your skin for 3-5 minutes.  Rinse the solution off your body completely. Do not wash with your normal soap after the CHG soap solution.  Pat yourself dry with a clean, soft towel.  Do not apply powders, lotions or deodorants as these might block how the CHG soap works.   Dress in clean clothing.  Be sure to sleep with clean, freshly laundered sheets.  Be aware that CHG can cause stains on fabric. Rinse your washcloth and other linens that have contact with CHG completely. Use only non-chlorine detergents to launder the items used.          PRE-OPERATIVE INSTRUCTIONS    You will receive notification one business day prior to your procedure to confirm your arrival time. It is important that you answer your phone and/or check your messages during this time. If you do not hear from the surgery center by 5 pm. the day before your procedure, please call [phone].     Please enter the building through the Outpatient entrance and take the elevator off the lobby to the 2nd floor then check in at the Outpatient Surgery desk on the 2nd floor.    INSTRUCTIONS:  Talk to your surgeon for instructions if you should stop your aspirin, blood thinner, or diabetes medicines.  DO NOT take any multivitamins or over the counter supplements for 7-10 days before surgery.  If not being admitted, you must have an adult immediately available to drive you home after surgery. We also highly recommend you have someone stay with you for the entire day and night of your surgery.  For children having surgery, a parent or legal guardian must accompany them to the surgery center. If this is not possible, please call [phone] to  make additional arrangements.  For adults who are unable to consent or make medical decisions for themselves, a legal guardian or Power of  must accompany them to the surgery center. If this is not possible, please call [phone] to make additional arrangements.  Wear comfortable, loose fitting clothing.  All jewelry and piercings must be removed. If you are unable to remove an item or have a dermal piercing, please be sure to tell the nurse when you arrive for surgery.  Nail polish and make-up must be removed.  Avoid smoking or consuming alcohol for 24 hours before surgery.  To help prevent infection, please take a shower/bath and wash your hair the night before and/or morning of surgery (or follow other specific bathing instructions provided).    Preoperative Fasting Guidelines    Why must I stop eating and drinking near surgery time?  With sedation, food or liquid in your stomach can enter your lungs causing serious complications  Increases nausea and vomiting    When do I need to stop eating and drinking before my surgery?  Do not eat any solid food after midnight the night before your surgery/procedure unless otherwise instructed by your surgeon.   You may have up to 13.5 ounces of clear liquid until TWO hours before your instructed arrival time to the hospital.  This includes water, black tea/coffee, (no milk or cream) apple juice, and electrolyte drinks (Gatorade).   You may chew gum until TWO hours before your surgery/procedure      If applicable, notify your surgeons office immediately of any new skin changes that occur to the surgical limb.      If you have any questions or concerns, please call Pre-Admission Testing at [phone].

## 2024-10-24 LAB
ABO GROUP (TYPE) IN BLOOD: NORMAL
ANTIBODY SCREEN: NORMAL
BACTERIA UR CULT: NORMAL
HOLD SPECIMEN: NORMAL
RH FACTOR (ANTIGEN D): NORMAL

## 2024-11-07 ENCOUNTER — HOSPITAL ENCOUNTER (INPATIENT)
Facility: HOSPITAL | Age: 74
LOS: 2 days | Discharge: HOME | End: 2024-11-09
Attending: UROLOGY | Admitting: UROLOGY
Payer: MEDICARE

## 2024-11-07 ENCOUNTER — ANESTHESIA EVENT (OUTPATIENT)
Dept: OPERATING ROOM | Facility: HOSPITAL | Age: 74
End: 2024-11-07
Payer: MEDICARE

## 2024-11-07 ENCOUNTER — ANESTHESIA (OUTPATIENT)
Dept: OPERATING ROOM | Facility: HOSPITAL | Age: 74
End: 2024-11-07
Payer: MEDICARE

## 2024-11-07 DIAGNOSIS — C66.2: ICD-10-CM

## 2024-11-07 DIAGNOSIS — R31.0 GROSS HEMATURIA: ICD-10-CM

## 2024-11-07 DIAGNOSIS — C66.2 MALIGNANT NEOPLASM OF LEFT URETER (MULTI): Primary | ICD-10-CM

## 2024-11-07 DIAGNOSIS — N28.89 URETERAL MASS: ICD-10-CM

## 2024-11-07 LAB
ABO GROUP (TYPE) IN BLOOD: NORMAL
ANION GAP BLDA CALCULATED.4IONS-SCNC: 11 MMO/L (ref 10–25)
ANION GAP BLDA CALCULATED.4IONS-SCNC: 8 MMO/L (ref 10–25)
ANION GAP SERPL CALC-SCNC: 13 MMOL/L (ref 10–20)
BASE EXCESS BLDA CALC-SCNC: -0.7 MMOL/L (ref -2–3)
BASE EXCESS BLDA CALC-SCNC: -0.9 MMOL/L (ref -2–3)
BODY TEMPERATURE: 37 DEGREES CELSIUS
BODY TEMPERATURE: 37 DEGREES CELSIUS
BUN SERPL-MCNC: 20 MG/DL (ref 6–23)
CA-I BLDA-SCNC: 1.05 MMOL/L (ref 1.1–1.33)
CA-I BLDA-SCNC: 1.06 MMOL/L (ref 1.1–1.33)
CALCIUM SERPL-MCNC: 8.1 MG/DL (ref 8.6–10.3)
CHLORIDE BLDA-SCNC: 108 MMOL/L (ref 98–107)
CHLORIDE BLDA-SCNC: 112 MMOL/L (ref 98–107)
CHLORIDE SERPL-SCNC: 106 MMOL/L (ref 98–107)
CO2 SERPL-SCNC: 25 MMOL/L (ref 21–32)
COHGB MFR BLDA: 0.3 %
COHGB MFR BLDA: 0.5 %
CREAT SERPL-MCNC: 1.01 MG/DL (ref 0.5–1.05)
DO-HGB MFR BLDA: 0.8 % (ref 0–5)
DO-HGB MFR BLDA: 1.2 % (ref 0–5)
EGFRCR SERPLBLD CKD-EPI 2021: 59 ML/MIN/1.73M*2
ERYTHROCYTE [DISTWIDTH] IN BLOOD BY AUTOMATED COUNT: 13.3 % (ref 11.5–14.5)
GLUCOSE BLDA-MCNC: 137 MG/DL (ref 74–99)
GLUCOSE BLDA-MCNC: 75 MG/DL (ref 74–99)
GLUCOSE SERPL-MCNC: 141 MG/DL (ref 74–99)
HCO3 BLDA-SCNC: 23.5 MMOL/L (ref 22–26)
HCO3 BLDA-SCNC: 24.2 MMOL/L (ref 22–26)
HCT VFR BLD AUTO: 37.1 % (ref 36–46)
HCT VFR BLD EST: 30 % (ref 36–46)
HCT VFR BLD EST: 33 % (ref 36–46)
HGB BLD-MCNC: 11.7 G/DL (ref 12–16)
HGB BLDA-MCNC: 10.1 G/DL (ref 12–16)
HGB BLDA-MCNC: 10.1 G/DL (ref 12–16)
HGB BLDA-MCNC: 11.1 G/DL (ref 12–16)
HGB BLDA-MCNC: 11.1 G/DL (ref 12–16)
HOLD SPECIMEN: NORMAL
LACTATE BLDA-SCNC: 0.6 MMOL/L (ref 0.4–2)
LACTATE BLDA-SCNC: 0.6 MMOL/L (ref 0.4–2)
MCH RBC QN AUTO: 28.1 PG (ref 26–34)
MCHC RBC AUTO-ENTMCNC: 31.5 G/DL (ref 32–36)
MCV RBC AUTO: 89 FL (ref 80–100)
METHGB MFR BLDA: 0.6 % (ref 0–1.5)
METHGB MFR BLDA: 0.8 % (ref 0–1.5)
NRBC BLD-RTO: 0 /100 WBCS (ref 0–0)
OXYHGB MFR BLDA: 97.9 % (ref 94–98)
PCO2 BLDA: 37 MM HG (ref 38–42)
PCO2 BLDA: 40 MM HG (ref 38–42)
PH BLDA: 7.39 PH (ref 7.38–7.42)
PH BLDA: 7.41 PH (ref 7.38–7.42)
PLATELET # BLD AUTO: 176 X10*3/UL (ref 150–450)
PO2 BLDA: 208 MM HG (ref 85–95)
PO2 BLDA: 236 MM HG (ref 85–95)
POTASSIUM BLDA-SCNC: 3.2 MMOL/L (ref 3.5–5.3)
POTASSIUM BLDA-SCNC: 4.1 MMOL/L (ref 3.5–5.3)
POTASSIUM SERPL-SCNC: 3.9 MMOL/L (ref 3.5–5.3)
RBC # BLD AUTO: 4.17 X10*6/UL (ref 4–5.2)
RH FACTOR (ANTIGEN D): NORMAL
SAO2 % BLDA: 99 % (ref 94–100)
SAO2 % BLDA: 99 % (ref 94–100)
SODIUM BLDA-SCNC: 139 MMOL/L (ref 136–145)
SODIUM BLDA-SCNC: 140 MMOL/L (ref 136–145)
SODIUM SERPL-SCNC: 140 MMOL/L (ref 136–145)
WBC # BLD AUTO: 12 X10*3/UL (ref 4.4–11.3)

## 2024-11-07 PROCEDURE — 2780000003 HC OR 278 NO HCPCS: Performed by: UROLOGY

## 2024-11-07 PROCEDURE — 2500000004 HC RX 250 GENERAL PHARMACY W/ HCPCS (ALT 636 FOR OP/ED): Performed by: ANESTHESIOLOGIST ASSISTANT

## 2024-11-07 PROCEDURE — 84132 ASSAY OF SERUM POTASSIUM: CPT | Performed by: UROLOGY

## 2024-11-07 PROCEDURE — 86920 COMPATIBILITY TEST SPIN: CPT

## 2024-11-07 PROCEDURE — 85027 COMPLETE CBC AUTOMATED: CPT | Performed by: UROLOGY

## 2024-11-07 PROCEDURE — 3600000017 HC OR TIME - EACH INCREMENTAL 1 MINUTE - PROCEDURE LEVEL SIX: Performed by: UROLOGY

## 2024-11-07 PROCEDURE — 83050 HGB METHEMOGLOBIN QUAN: CPT

## 2024-11-07 PROCEDURE — 2500000004 HC RX 250 GENERAL PHARMACY W/ HCPCS (ALT 636 FOR OP/ED): Performed by: UROLOGY

## 2024-11-07 PROCEDURE — 0TSB4ZZ REPOSITION BLADDER, PERCUTANEOUS ENDOSCOPIC APPROACH: ICD-10-PCS | Performed by: UROLOGY

## 2024-11-07 PROCEDURE — C2617 STENT, NON-COR, TEM W/O DEL: HCPCS | Performed by: UROLOGY

## 2024-11-07 PROCEDURE — 84132 ASSAY OF SERUM POTASSIUM: CPT

## 2024-11-07 PROCEDURE — 3700000002 HC GENERAL ANESTHESIA TIME - EACH INCREMENTAL 1 MINUTE: Performed by: UROLOGY

## 2024-11-07 PROCEDURE — 2500000001 HC RX 250 WO HCPCS SELF ADMINISTERED DRUGS (ALT 637 FOR MEDICARE OP): Performed by: UROLOGY

## 2024-11-07 PROCEDURE — 0T778DZ DILATION OF LEFT URETER WITH INTRALUMINAL DEVICE, VIA NATURAL OR ARTIFICIAL OPENING ENDOSCOPIC: ICD-10-PCS | Performed by: UROLOGY

## 2024-11-07 PROCEDURE — 2500000004 HC RX 250 GENERAL PHARMACY W/ HCPCS (ALT 636 FOR OP/ED): Performed by: NURSE ANESTHETIST, CERTIFIED REGISTERED

## 2024-11-07 PROCEDURE — 2720000007 HC OR 272 NO HCPCS: Performed by: UROLOGY

## 2024-11-07 PROCEDURE — C1769 GUIDE WIRE: HCPCS | Performed by: UROLOGY

## 2024-11-07 PROCEDURE — 7100000001 HC RECOVERY ROOM TIME - INITIAL BASE CHARGE: Performed by: UROLOGY

## 2024-11-07 PROCEDURE — 2500000004 HC RX 250 GENERAL PHARMACY W/ HCPCS (ALT 636 FOR OP/ED): Performed by: ANESTHESIOLOGY

## 2024-11-07 PROCEDURE — 0TB74ZZ EXCISION OF LEFT URETER, PERCUTANEOUS ENDOSCOPIC APPROACH: ICD-10-PCS | Performed by: UROLOGY

## 2024-11-07 PROCEDURE — 88307 TISSUE EXAM BY PATHOLOGIST: CPT | Performed by: PATHOLOGY

## 2024-11-07 PROCEDURE — 37799 UNLISTED PX VASCULAR SURGERY: CPT | Performed by: UROLOGY

## 2024-11-07 PROCEDURE — 07BC4ZZ EXCISION OF PELVIS LYMPHATIC, PERCUTANEOUS ENDOSCOPIC APPROACH: ICD-10-PCS | Performed by: UROLOGY

## 2024-11-07 PROCEDURE — 2500000004 HC RX 250 GENERAL PHARMACY W/ HCPCS (ALT 636 FOR OP/ED): Mod: JZ | Performed by: UROLOGY

## 2024-11-07 PROCEDURE — 3700000001 HC GENERAL ANESTHESIA TIME - INITIAL BASE CHARGE: Performed by: UROLOGY

## 2024-11-07 PROCEDURE — 1100000001 HC PRIVATE ROOM DAILY

## 2024-11-07 PROCEDURE — C1758 CATHETER, URETERAL: HCPCS | Performed by: UROLOGY

## 2024-11-07 PROCEDURE — 88304 TISSUE EXAM BY PATHOLOGIST: CPT | Performed by: PATHOLOGY

## 2024-11-07 PROCEDURE — 2500000005 HC RX 250 GENERAL PHARMACY W/O HCPCS: Performed by: ANESTHESIOLOGY

## 2024-11-07 PROCEDURE — 7100000002 HC RECOVERY ROOM TIME - EACH INCREMENTAL 1 MINUTE: Performed by: UROLOGY

## 2024-11-07 PROCEDURE — 36415 COLL VENOUS BLD VENIPUNCTURE: CPT | Performed by: UROLOGY

## 2024-11-07 PROCEDURE — 3600000018 HC OR TIME - INITIAL BASE CHARGE - PROCEDURE LEVEL SIX: Performed by: UROLOGY

## 2024-11-07 PROCEDURE — 88307 TISSUE EXAM BY PATHOLOGIST: CPT | Mod: TC,STJLAB | Performed by: UROLOGY

## 2024-11-07 PROCEDURE — 8E0W4CZ ROBOTIC ASSISTED PROCEDURE OF TRUNK REGION, PERCUTANEOUS ENDOSCOPIC APPROACH: ICD-10-PCS | Performed by: UROLOGY

## 2024-11-07 PROCEDURE — 82375 ASSAY CARBOXYHB QUANT: CPT

## 2024-11-07 DEVICE — URETERAL STENT
Type: IMPLANTABLE DEVICE | Site: URETER | Status: FUNCTIONAL
Brand: CONTOUR™

## 2024-11-07 RX ORDER — POLYETHYLENE GLYCOL 3350 17 G/17G
17 POWDER, FOR SOLUTION ORAL DAILY
Status: DISCONTINUED | OUTPATIENT
Start: 2024-11-08 | End: 2024-11-09 | Stop reason: HOSPADM

## 2024-11-07 RX ORDER — ONDANSETRON HYDROCHLORIDE 2 MG/ML
4 INJECTION, SOLUTION INTRAVENOUS ONCE AS NEEDED
Status: DISCONTINUED | OUTPATIENT
Start: 2024-11-07 | End: 2024-11-07 | Stop reason: HOSPADM

## 2024-11-07 RX ORDER — ATORVASTATIN CALCIUM 20 MG/1
20 TABLET, FILM COATED ORAL DAILY
Status: DISCONTINUED | OUTPATIENT
Start: 2024-11-08 | End: 2024-11-09 | Stop reason: HOSPADM

## 2024-11-07 RX ORDER — ACETAMINOPHEN 325 MG/1
975 TABLET ORAL EVERY 6 HOURS
Status: DISCONTINUED | OUTPATIENT
Start: 2024-11-07 | End: 2024-11-09 | Stop reason: HOSPADM

## 2024-11-07 RX ORDER — CEFAZOLIN SODIUM 2 G/100ML
2 INJECTION, SOLUTION INTRAVENOUS
Status: DISCONTINUED | OUTPATIENT
Start: 2024-11-07 | End: 2024-11-07 | Stop reason: SDUPTHER

## 2024-11-07 RX ORDER — CEFAZOLIN SODIUM 1 G/50ML
1 SOLUTION INTRAVENOUS EVERY 8 HOURS
Status: COMPLETED | OUTPATIENT
Start: 2024-11-07 | End: 2024-11-08

## 2024-11-07 RX ORDER — ACETAMINOPHEN 325 MG/1
650 TABLET ORAL EVERY 4 HOURS PRN
Status: DISCONTINUED | OUTPATIENT
Start: 2024-11-07 | End: 2024-11-07 | Stop reason: HOSPADM

## 2024-11-07 RX ORDER — HYDRALAZINE HYDROCHLORIDE 20 MG/ML
5 INJECTION INTRAMUSCULAR; INTRAVENOUS EVERY 30 MIN PRN
Status: DISCONTINUED | OUTPATIENT
Start: 2024-11-07 | End: 2024-11-07 | Stop reason: HOSPADM

## 2024-11-07 RX ORDER — ROCURONIUM BROMIDE 10 MG/ML
INJECTION, SOLUTION INTRAVENOUS AS NEEDED
Status: DISCONTINUED | OUTPATIENT
Start: 2024-11-07 | End: 2024-11-07

## 2024-11-07 RX ORDER — HYDROMORPHONE HYDROCHLORIDE 1 MG/ML
1 INJECTION, SOLUTION INTRAMUSCULAR; INTRAVENOUS; SUBCUTANEOUS EVERY 5 MIN PRN
Status: DISCONTINUED | OUTPATIENT
Start: 2024-11-07 | End: 2024-11-07 | Stop reason: HOSPADM

## 2024-11-07 RX ORDER — PHENAZOPYRIDINE HYDROCHLORIDE 100 MG/1
200 TABLET, FILM COATED ORAL ONCE AS NEEDED
Status: DISCONTINUED | OUTPATIENT
Start: 2024-11-07 | End: 2024-11-07 | Stop reason: HOSPADM

## 2024-11-07 RX ORDER — ACETAMINOPHEN 325 MG/1
975 TABLET ORAL ONCE
Status: DISCONTINUED | OUTPATIENT
Start: 2024-11-07 | End: 2024-11-07 | Stop reason: HOSPADM

## 2024-11-07 RX ORDER — ONDANSETRON 4 MG/1
4 TABLET, FILM COATED ORAL EVERY 8 HOURS PRN
Status: DISCONTINUED | OUTPATIENT
Start: 2024-11-07 | End: 2024-11-09 | Stop reason: HOSPADM

## 2024-11-07 RX ORDER — DEXAMETHASONE SODIUM PHOSPHATE 10 MG/ML
INJECTION INTRAMUSCULAR; INTRAVENOUS AS NEEDED
Status: DISCONTINUED | OUTPATIENT
Start: 2024-11-07 | End: 2024-11-07

## 2024-11-07 RX ORDER — HYDROMORPHONE HYDROCHLORIDE 0.2 MG/ML
0.1 INJECTION INTRAMUSCULAR; INTRAVENOUS; SUBCUTANEOUS EVERY 5 MIN PRN
Status: DISCONTINUED | OUTPATIENT
Start: 2024-11-07 | End: 2024-11-07 | Stop reason: HOSPADM

## 2024-11-07 RX ORDER — MIDAZOLAM HYDROCHLORIDE 1 MG/ML
INJECTION, SOLUTION INTRAMUSCULAR; INTRAVENOUS AS NEEDED
Status: DISCONTINUED | OUTPATIENT
Start: 2024-11-07 | End: 2024-11-07

## 2024-11-07 RX ORDER — HYDROMORPHONE HYDROCHLORIDE 1 MG/ML
INJECTION, SOLUTION INTRAMUSCULAR; INTRAVENOUS; SUBCUTANEOUS AS NEEDED
Status: DISCONTINUED | OUTPATIENT
Start: 2024-11-07 | End: 2024-11-07

## 2024-11-07 RX ORDER — HEPARIN SODIUM 5000 [USP'U]/ML
5000 INJECTION, SOLUTION INTRAVENOUS; SUBCUTANEOUS
Status: DISCONTINUED | OUTPATIENT
Start: 2024-11-07 | End: 2024-11-07 | Stop reason: HOSPADM

## 2024-11-07 RX ORDER — OXYCODONE AND ACETAMINOPHEN 5; 325 MG/1; MG/1
1 TABLET ORAL EVERY 4 HOURS PRN
Status: DISCONTINUED | OUTPATIENT
Start: 2024-11-07 | End: 2024-11-07 | Stop reason: HOSPADM

## 2024-11-07 RX ORDER — FENTANYL CITRATE 50 UG/ML
INJECTION, SOLUTION INTRAMUSCULAR; INTRAVENOUS AS NEEDED
Status: DISCONTINUED | OUTPATIENT
Start: 2024-11-07 | End: 2024-11-07

## 2024-11-07 RX ORDER — SODIUM CHLORIDE, SODIUM LACTATE, POTASSIUM CHLORIDE, CALCIUM CHLORIDE 600; 310; 30; 20 MG/100ML; MG/100ML; MG/100ML; MG/100ML
125 INJECTION, SOLUTION INTRAVENOUS CONTINUOUS
Status: DISCONTINUED | OUTPATIENT
Start: 2024-11-07 | End: 2024-11-07 | Stop reason: HOSPADM

## 2024-11-07 RX ORDER — HEPARIN SODIUM 5000 [USP'U]/ML
5000 INJECTION, SOLUTION INTRAVENOUS; SUBCUTANEOUS
Status: DISCONTINUED | OUTPATIENT
Start: 2024-11-07 | End: 2024-11-07 | Stop reason: SDUPTHER

## 2024-11-07 RX ORDER — ONDANSETRON HYDROCHLORIDE 2 MG/ML
INJECTION, SOLUTION INTRAVENOUS AS NEEDED
Status: DISCONTINUED | OUTPATIENT
Start: 2024-11-07 | End: 2024-11-07

## 2024-11-07 RX ORDER — ONDANSETRON HYDROCHLORIDE 2 MG/ML
4 INJECTION, SOLUTION INTRAVENOUS EVERY 8 HOURS PRN
Status: DISCONTINUED | OUTPATIENT
Start: 2024-11-07 | End: 2024-11-09 | Stop reason: HOSPADM

## 2024-11-07 RX ORDER — PHENYLEPHRINE HCL IN 0.9% NACL 1 MG/10 ML
SYRINGE (ML) INTRAVENOUS AS NEEDED
Status: DISCONTINUED | OUTPATIENT
Start: 2024-11-07 | End: 2024-11-07

## 2024-11-07 RX ORDER — POTASSIUM CHLORIDE 14.9 MG/ML
20 INJECTION INTRAVENOUS
Status: DISPENSED | OUTPATIENT
Start: 2024-11-07 | End: 2024-11-07

## 2024-11-07 RX ORDER — ALBUTEROL SULFATE 0.83 MG/ML
2.5 SOLUTION RESPIRATORY (INHALATION) ONCE AS NEEDED
Status: DISCONTINUED | OUTPATIENT
Start: 2024-11-07 | End: 2024-11-07 | Stop reason: HOSPADM

## 2024-11-07 RX ORDER — PROPOFOL 10 MG/ML
INJECTION, EMULSION INTRAVENOUS AS NEEDED
Status: DISCONTINUED | OUTPATIENT
Start: 2024-11-07 | End: 2024-11-07

## 2024-11-07 RX ORDER — CEFAZOLIN SODIUM 2 G/100ML
2 INJECTION, SOLUTION INTRAVENOUS
Status: COMPLETED | OUTPATIENT
Start: 2024-11-07 | End: 2024-11-07

## 2024-11-07 RX ORDER — OXYCODONE HYDROCHLORIDE 10 MG/1
10 TABLET ORAL EVERY 4 HOURS PRN
Status: DISCONTINUED | OUTPATIENT
Start: 2024-11-07 | End: 2024-11-07 | Stop reason: HOSPADM

## 2024-11-07 RX ORDER — HEPARIN SODIUM 5000 [USP'U]/ML
5000 INJECTION, SOLUTION INTRAVENOUS; SUBCUTANEOUS EVERY 12 HOURS
Status: DISCONTINUED | OUTPATIENT
Start: 2024-11-07 | End: 2024-11-09 | Stop reason: HOSPADM

## 2024-11-07 RX ORDER — KETOROLAC TROMETHAMINE 30 MG/ML
15 INJECTION, SOLUTION INTRAMUSCULAR; INTRAVENOUS EVERY 8 HOURS
Status: DISCONTINUED | OUTPATIENT
Start: 2024-11-07 | End: 2024-11-09 | Stop reason: HOSPADM

## 2024-11-07 RX ORDER — SODIUM CHLORIDE, SODIUM LACTATE, POTASSIUM CHLORIDE, CALCIUM CHLORIDE 600; 310; 30; 20 MG/100ML; MG/100ML; MG/100ML; MG/100ML
100 INJECTION, SOLUTION INTRAVENOUS CONTINUOUS
Status: ACTIVE | OUTPATIENT
Start: 2024-11-07 | End: 2024-11-08

## 2024-11-07 RX ORDER — LIDOCAINE HYDROCHLORIDE 20 MG/ML
INJECTION, SOLUTION EPIDURAL; INFILTRATION; INTRACAUDAL; PERINEURAL AS NEEDED
Status: DISCONTINUED | OUTPATIENT
Start: 2024-11-07 | End: 2024-11-07

## 2024-11-07 RX ORDER — OXYCODONE HYDROCHLORIDE 5 MG/1
5 TABLET ORAL EVERY 4 HOURS PRN
Status: DISCONTINUED | OUTPATIENT
Start: 2024-11-07 | End: 2024-11-09 | Stop reason: HOSPADM

## 2024-11-07 RX ORDER — CALCIUM CARBONATE 200(500)MG
1500 TABLET,CHEWABLE ORAL 2 TIMES DAILY
Status: DISCONTINUED | OUTPATIENT
Start: 2024-11-07 | End: 2024-11-09 | Stop reason: HOSPADM

## 2024-11-07 RX ORDER — MIDAZOLAM HYDROCHLORIDE 1 MG/ML
1 INJECTION, SOLUTION INTRAMUSCULAR; INTRAVENOUS ONCE AS NEEDED
Status: DISCONTINUED | OUTPATIENT
Start: 2024-11-07 | End: 2024-11-07 | Stop reason: HOSPADM

## 2024-11-07 RX ORDER — BUPIVACAINE HYDROCHLORIDE 5 MG/ML
INJECTION, SOLUTION PERINEURAL AS NEEDED
Status: DISCONTINUED | OUTPATIENT
Start: 2024-11-07 | End: 2024-11-07 | Stop reason: HOSPADM

## 2024-11-07 RX ORDER — LANOLIN ALCOHOL/MO/W.PET/CERES
400 CREAM (GRAM) TOPICAL DAILY
Status: DISCONTINUED | OUTPATIENT
Start: 2024-11-08 | End: 2024-11-09 | Stop reason: HOSPADM

## 2024-11-07 RX ORDER — SODIUM CHLORIDE, SODIUM GLUCONATE, SODIUM ACETATE, POTASSIUM CHLORIDE AND MAGNESIUM CHLORIDE 30; 37; 368; 526; 502 MG/100ML; MG/100ML; MG/100ML; MG/100ML; MG/100ML
INJECTION, SOLUTION INTRAVENOUS CONTINUOUS PRN
Status: DISCONTINUED | OUTPATIENT
Start: 2024-11-07 | End: 2024-11-07

## 2024-11-07 RX ORDER — CHOLECALCIFEROL (VITAMIN D3) 25 MCG
4000 TABLET ORAL DAILY
Status: DISCONTINUED | OUTPATIENT
Start: 2024-11-08 | End: 2024-11-09 | Stop reason: HOSPADM

## 2024-11-07 RX ADMIN — Medication 2 L/MIN: at 20:45

## 2024-11-07 RX ADMIN — Medication 8 L/MIN: at 20:05

## 2024-11-07 RX ADMIN — HYDROMORPHONE HYDROCHLORIDE 0.5 MG: 1 INJECTION, SOLUTION INTRAMUSCULAR; INTRAVENOUS; SUBCUTANEOUS at 20:52

## 2024-11-07 RX ADMIN — HEPARIN SODIUM 5000 UNITS: 5000 INJECTION, SOLUTION INTRAVENOUS; SUBCUTANEOUS at 22:08

## 2024-11-07 RX ADMIN — CEFAZOLIN SODIUM 1 G: 1 INJECTION, SOLUTION INTRAVENOUS at 22:08

## 2024-11-07 RX ADMIN — CALCIUM CARBONATE (ANTACID) CHEW TAB 500 MG 1500 MG: 500 CHEW TAB at 22:08

## 2024-11-07 RX ADMIN — KETOROLAC TROMETHAMINE 15 MG: 30 INJECTION, SOLUTION INTRAMUSCULAR at 22:09

## 2024-11-07 RX ADMIN — ACETAMINOPHEN 325MG 975 MG: 325 TABLET ORAL at 22:08

## 2024-11-07 RX ADMIN — SODIUM CHLORIDE, POTASSIUM CHLORIDE, SODIUM LACTATE AND CALCIUM CHLORIDE 100 ML/HR: 600; 310; 30; 20 INJECTION, SOLUTION INTRAVENOUS at 22:00

## 2024-11-07 SDOH — SOCIAL STABILITY: SOCIAL INSECURITY: DO YOU FEEL ANYONE HAS EXPLOITED OR TAKEN ADVANTAGE OF YOU FINANCIALLY OR OF YOUR PERSONAL PROPERTY?: NO

## 2024-11-07 SDOH — SOCIAL STABILITY: SOCIAL NETWORK: IN A TYPICAL WEEK, HOW MANY TIMES DO YOU TALK ON THE PHONE WITH FAMILY, FRIENDS, OR NEIGHBORS?: THREE TIMES A WEEK

## 2024-11-07 SDOH — SOCIAL STABILITY: SOCIAL INSECURITY: DOES ANYONE TRY TO KEEP YOU FROM HAVING/CONTACTING OTHER FRIENDS OR DOING THINGS OUTSIDE YOUR HOME?: NO

## 2024-11-07 SDOH — ECONOMIC STABILITY: FOOD INSECURITY: WITHIN THE PAST 12 MONTHS, THE FOOD YOU BOUGHT JUST DIDN'T LAST AND YOU DIDN'T HAVE MONEY TO GET MORE.: NEVER TRUE

## 2024-11-07 SDOH — HEALTH STABILITY: PHYSICAL HEALTH
HOW OFTEN DO YOU NEED TO HAVE SOMEONE HELP YOU WHEN YOU READ INSTRUCTIONS, PAMPHLETS, OR OTHER WRITTEN MATERIAL FROM YOUR DOCTOR OR PHARMACY?: NEVER

## 2024-11-07 SDOH — SOCIAL STABILITY: SOCIAL INSECURITY
WITHIN THE LAST YEAR, HAVE YOU BEEN RAPED OR FORCED TO HAVE ANY KIND OF SEXUAL ACTIVITY BY YOUR PARTNER OR EX-PARTNER?: NO

## 2024-11-07 SDOH — SOCIAL STABILITY: SOCIAL INSECURITY: ARE THERE ANY APPARENT SIGNS OF INJURIES/BEHAVIORS THAT COULD BE RELATED TO ABUSE/NEGLECT?: NO

## 2024-11-07 SDOH — SOCIAL STABILITY: SOCIAL INSECURITY: ARE YOU MARRIED, WIDOWED, DIVORCED, SEPARATED, NEVER MARRIED, OR LIVING WITH A PARTNER?: MARRIED

## 2024-11-07 SDOH — HEALTH STABILITY: MENTAL HEALTH: HOW OFTEN DO YOU HAVE SIX OR MORE DRINKS ON ONE OCCASION?: NEVER

## 2024-11-07 SDOH — SOCIAL STABILITY: SOCIAL INSECURITY: WITHIN THE LAST YEAR, HAVE YOU BEEN HUMILIATED OR EMOTIONALLY ABUSED IN OTHER WAYS BY YOUR PARTNER OR EX-PARTNER?: NO

## 2024-11-07 SDOH — SOCIAL STABILITY: SOCIAL INSECURITY: ARE YOU OR HAVE YOU BEEN THREATENED OR ABUSED PHYSICALLY, EMOTIONALLY, OR SEXUALLY BY ANYONE?: NO

## 2024-11-07 SDOH — SOCIAL STABILITY: SOCIAL INSECURITY: HAVE YOU HAD THOUGHTS OF HARMING ANYONE ELSE?: NO

## 2024-11-07 SDOH — ECONOMIC STABILITY: INCOME INSECURITY: IN THE PAST 12 MONTHS HAS THE ELECTRIC, GAS, OIL, OR WATER COMPANY THREATENED TO SHUT OFF SERVICES IN YOUR HOME?: NO

## 2024-11-07 SDOH — SOCIAL STABILITY: SOCIAL INSECURITY: WITHIN THE LAST YEAR, HAVE YOU BEEN AFRAID OF YOUR PARTNER OR EX-PARTNER?: NO

## 2024-11-07 SDOH — ECONOMIC STABILITY: FOOD INSECURITY: WITHIN THE PAST 12 MONTHS, YOU WORRIED THAT YOUR FOOD WOULD RUN OUT BEFORE YOU GOT THE MONEY TO BUY MORE.: NEVER TRUE

## 2024-11-07 SDOH — SOCIAL STABILITY: SOCIAL NETWORK: HOW OFTEN DO YOU ATTEND MEETINGS OF THE CLUBS OR ORGANIZATIONS YOU BELONG TO?: NEVER

## 2024-11-07 SDOH — SOCIAL STABILITY: SOCIAL INSECURITY: HAVE YOU HAD ANY THOUGHTS OF HARMING ANYONE ELSE?: NO

## 2024-11-07 SDOH — SOCIAL STABILITY: SOCIAL INSECURITY: HAS ANYONE EVER THREATENED TO HURT YOUR FAMILY OR YOUR PETS?: NO

## 2024-11-07 SDOH — SOCIAL STABILITY: SOCIAL NETWORK
DO YOU BELONG TO ANY CLUBS OR ORGANIZATIONS SUCH AS CHURCH GROUPS, UNIONS, FRATERNAL OR ATHLETIC GROUPS, OR SCHOOL GROUPS?: NO

## 2024-11-07 SDOH — SOCIAL STABILITY: SOCIAL INSECURITY
WITHIN THE LAST YEAR, HAVE YOU BEEN KICKED, HIT, SLAPPED, OR OTHERWISE PHYSICALLY HURT BY YOUR PARTNER OR EX-PARTNER?: NO

## 2024-11-07 SDOH — HEALTH STABILITY: MENTAL HEALTH: CURRENT SMOKER: 0

## 2024-11-07 SDOH — SOCIAL STABILITY: SOCIAL NETWORK: HOW OFTEN DO YOU GET TOGETHER WITH FRIENDS OR RELATIVES?: TWICE A WEEK

## 2024-11-07 SDOH — HEALTH STABILITY: MENTAL HEALTH: HOW OFTEN DO YOU HAVE A DRINK CONTAINING ALCOHOL?: NEVER

## 2024-11-07 SDOH — HEALTH STABILITY: PHYSICAL HEALTH: ON AVERAGE, HOW MANY MINUTES DO YOU ENGAGE IN EXERCISE AT THIS LEVEL?: 30 MIN

## 2024-11-07 SDOH — SOCIAL STABILITY: SOCIAL NETWORK: HOW OFTEN DO YOU ATTEND CHURCH OR RELIGIOUS SERVICES?: 1 TO 4 TIMES PER YEAR

## 2024-11-07 SDOH — SOCIAL STABILITY: SOCIAL INSECURITY: WERE YOU ABLE TO COMPLETE ALL THE BEHAVIORAL HEALTH SCREENINGS?: YES

## 2024-11-07 SDOH — HEALTH STABILITY: MENTAL HEALTH
DO YOU FEEL STRESS - TENSE, RESTLESS, NERVOUS, OR ANXIOUS, OR UNABLE TO SLEEP AT NIGHT BECAUSE YOUR MIND IS TROUBLED ALL THE TIME - THESE DAYS?: NOT AT ALL

## 2024-11-07 SDOH — HEALTH STABILITY: MENTAL HEALTH: HOW MANY DRINKS CONTAINING ALCOHOL DO YOU HAVE ON A TYPICAL DAY WHEN YOU ARE DRINKING?: PATIENT DOES NOT DRINK

## 2024-11-07 SDOH — HEALTH STABILITY: PHYSICAL HEALTH: ON AVERAGE, HOW MANY DAYS PER WEEK DO YOU ENGAGE IN MODERATE TO STRENUOUS EXERCISE (LIKE A BRISK WALK)?: 4 DAYS

## 2024-11-07 SDOH — SOCIAL STABILITY: SOCIAL INSECURITY: ABUSE: ADULT

## 2024-11-07 SDOH — SOCIAL STABILITY: SOCIAL INSECURITY: DO YOU FEEL UNSAFE GOING BACK TO THE PLACE WHERE YOU ARE LIVING?: NO

## 2024-11-07 ASSESSMENT — LIFESTYLE VARIABLES
SKIP TO QUESTIONS 9-10: 1
AUDIT-C TOTAL SCORE: 0
PRESCIPTION_ABUSE_PAST_12_MONTHS: NO
SUBSTANCE_ABUSE_PAST_12_MONTHS: NO
HOW OFTEN DO YOU HAVE 6 OR MORE DRINKS ON ONE OCCASION: NEVER
AUDIT-C TOTAL SCORE: 0
AUDIT-C TOTAL SCORE: 0
HOW OFTEN DO YOU HAVE A DRINK CONTAINING ALCOHOL: NEVER
HOW MANY STANDARD DRINKS CONTAINING ALCOHOL DO YOU HAVE ON A TYPICAL DAY: PATIENT DOES NOT DRINK
SKIP TO QUESTIONS 9-10: 1

## 2024-11-07 ASSESSMENT — ACTIVITIES OF DAILY LIVING (ADL)
DRESSING YOURSELF: NEEDS ASSISTANCE
JUDGMENT_ADEQUATE_SAFELY_COMPLETE_DAILY_ACTIVITIES: YES
HEARING - RIGHT EAR: FUNCTIONAL
WALKS IN HOME: INDEPENDENT
LACK_OF_TRANSPORTATION: NO
DRESSING YOURSELF: NEEDS ASSISTANCE
HEARING - LEFT EAR: FUNCTIONAL
GROOMING: INDEPENDENT
HEARING - RIGHT EAR: FUNCTIONAL
WALKS IN HOME: INDEPENDENT
FEEDING YOURSELF: INDEPENDENT
TOILETING: NEEDS ASSISTANCE
JUDGMENT_ADEQUATE_SAFELY_COMPLETE_DAILY_ACTIVITIES: YES
FEEDING YOURSELF: INDEPENDENT
BATHING: INDEPENDENT
HEARING - LEFT EAR: FUNCTIONAL
BATHING: INDEPENDENT
ADEQUATE_TO_COMPLETE_ADL: YES
PATIENT'S MEMORY ADEQUATE TO SAFELY COMPLETE DAILY ACTIVITIES?: YES
GROOMING: INDEPENDENT
ADEQUATE_TO_COMPLETE_ADL: YES
PATIENT'S MEMORY ADEQUATE TO SAFELY COMPLETE DAILY ACTIVITIES?: YES
TOILETING: NEEDS ASSISTANCE

## 2024-11-07 ASSESSMENT — PATIENT HEALTH QUESTIONNAIRE - PHQ9
2. FEELING DOWN, DEPRESSED OR HOPELESS: NOT AT ALL
SUM OF ALL RESPONSES TO PHQ9 QUESTIONS 1 & 2: 0
1. LITTLE INTEREST OR PLEASURE IN DOING THINGS: NOT AT ALL

## 2024-11-07 ASSESSMENT — COGNITIVE AND FUNCTIONAL STATUS - GENERAL
PATIENT BASELINE BEDBOUND: NO
DAILY ACTIVITIY SCORE: 19
DRESSING REGULAR LOWER BODY CLOTHING: A LITTLE
PERSONAL GROOMING: A LITTLE
MOVING TO AND FROM BED TO CHAIR: A LITTLE
MOVING FROM LYING ON BACK TO SITTING ON SIDE OF FLAT BED WITH BEDRAILS: A LITTLE
TURNING FROM BACK TO SIDE WHILE IN FLAT BAD: A LITTLE
TOILETING: A LITTLE
CLIMB 3 TO 5 STEPS WITH RAILING: A LOT
WALKING IN HOSPITAL ROOM: A LOT
HELP NEEDED FOR BATHING: A LITTLE
STANDING UP FROM CHAIR USING ARMS: A LOT
DRESSING REGULAR UPPER BODY CLOTHING: A LITTLE
MOBILITY SCORE: 15

## 2024-11-07 ASSESSMENT — PAIN SCALES - GENERAL
PAIN_LEVEL: 0
PAINLEVEL_OUTOF10: 6
PAINLEVEL_OUTOF10: 3
PAINLEVEL_OUTOF10: 0 - NO PAIN
PAINLEVEL_OUTOF10: 0 - NO PAIN

## 2024-11-07 ASSESSMENT — PAIN - FUNCTIONAL ASSESSMENT
PAIN_FUNCTIONAL_ASSESSMENT: 0-10

## 2024-11-07 ASSESSMENT — COLUMBIA-SUICIDE SEVERITY RATING SCALE - C-SSRS

## 2024-11-07 ASSESSMENT — PAIN DESCRIPTION - LOCATION: LOCATION: OTHER (COMMENT)

## 2024-11-07 NOTE — OP NOTE
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    Post-operative diagnosis: Left distal ureter urothelial cell carcinoma    Procedure performed: Robotic robotic left distal ureterectomy with reimplantation, left ureteroscopy, left stent placement, left pelvic lymph node dissection, psoas hitch, lysis of adhesions, intravesical gemcitabine instillation    Anesthesia: General    Estimated blood loss: 20 mL     Complications: none    Specimens: Left distal ureter with bladder cuff, left pelvic lymph nodes    Drains: 20 Ivorian Fermin catheter, 15 Ivorian Weston drain, 6 x 26 left double-J ureteral stent     Indications: Veronica Keith is a 74 y.o. female who underwent TURBT of a small bladder tumor and left retrograde pyelogram, left ureteroscopy, left ureteral biopsy, laser ablation of left ureteral tumor and left ureteral stent placement 9/12/2024.  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  The tumor appeared to be approximately 4 cm proximal to the ureteral orifice.  Patient now presents for robotic left distal ureterectomy with possible lymph node dissection and possible psoas hitch.  Plans were made for ureteroscopy of the renal pelvis prior to completing the robotic portion of the procedure to rule out additional upper tract disease.    Operative summary:   The risks and benefits of the procedure explained to the patient in the preoperative area and after informed consent was obtained the patient was taken back to the operative room.  The patient was transferred to the operating table and placed in the supine position. Prior to induction of anesthesia, EPC cuffs were on and functioning.  5000 units of subcutaneous heparin was administered.  The patient  received Ancef intravenously.  General anesthesia was induced and the patient was maintained in the supine position. All pressure points were padded and the patient was later placed in trendelenburg position. The patient was prepped and draped in a sterile fashion and a time-out was performed to confirm patient identity and procedure.  A 22 Stateless cystoscope with a 30 degree lens was inserted through the patient's urethra and into the bladder.  The bladder was fully evaluated and no persistent tumor was present in the bladder.  Attention was turned to the left ureteral orifice.  A guidewire was advanced into the left ureteral orifice and a flex ureteroscope was advanced alongside the guidewire and into the distal ureter.  However due to narrowing at the prior location of the left distal ureteral tumor, the scope was not able to be advanced more than 4 to 5 cm proximal to the ureteral orifice.  This area was dilated using a dual-lumen catheter and a flex ureteroscope was successfully advanced over guidewire and up to the kidney.  The kidney was fully evaluated beginning at the upper pole and continuing to the level midpole and lower pole.  Parenchymal thinning was noted but no papillary tumors were seen.  The ureteroscope was slowly withdrawn down the ureter and no proximal or mid ureteral tumors were seen.  The distal ureter was evaluated.  No papillary tumor seen but persistent inflammation and scar tissue consistent with stricture was noted in the location of the previously treated left distal ureteral tumor.  The tip of the ureteroscope was placed approximately 2 cm proximal to the abnormal portion of the ureter and secured in position for the robotic portion of the procedure.  Umbilical tape was used to secure the ureteroscope to a 20 Stateless Fermin catheter in the bladder.  A longitudinal midline incision was made using a 15 blade scalpel and access to the peritoneum was obtained using a Veress needle.   Pneumoperitoneum was obtained without complication.  An 8 Yakut robotic port was placed.  On the left side of the abdomen, an 8 mm robotic port and a 12 mm assistant port were placed under direct visualization.  Adhesions were present on the right side of the abdomen preventing port placement.  Adhesions were sharply divided without complication using laparoscopic michelle.  Two additional 8 mm robotic ports were then placed under direct visualization.  The robot was docked.  The sigmoid colon was mobilized medially exposing the left gonadal vein, psoas muscle, and dilated ureter.  The gonadal vein was spared.  The ureter was isolated over the common iliac artery and a vessel loop was placed around the ureter.  The dissection was then continued distally.  When we reached the tip of the ureteroscope, ureteroscopy was repeated to assure that we were proximal to the tumor.  A large Hem-o-velvet clip was then placed across the ureter.  Dissection continued distally.  The suspensory ligament of the ovary and round ligament were spared.  When our dissection reached the bladder, a stay suture was placed with 3-0 V-Loc suture and the bladder was incised.  The left ureteral orifice was identified through the cystotomy and I sure that a good bladder cuff was obtained.  The ureter with bladder cuff was then fully excised from the bladder and the bladder was closed in 2 layers using 3-0 V-Loc suture.  The closure was tested and found to be watertight.  The ureter was sharply divided proximal to the Hem-o-velvet clip and placed into an Endo Catch bag.  Attention was turned to mobilizing the bladder.  The left anterior peritoneum was incised lateral to the medial umbilical ligament in order to enter the space of Retzius.  The space was bluntly developed down to the pubic symphysis.  The process was repeated on the right side and finally any remaining midline attachments were divided in order to fully drop the bladder.  The bladder was  retracted medially and attention was turned to the left pelvic lymph node dissection.  Peritoneum was incised over the left external iliac vein and the left pelvic lymph node packet was isolated.  Hemoclip was used to seal the distal aspect of the lymph node packet and all attachments were divided.  The left obturator nerve was identified, which was kept intact throughout the entire dissection. Our dissection margins were the pelvic side wall laterally, the bladder medially, the bifurcation of the internal and external iliac vessels proximally, Sigifredo's ligament distally and the obturator nerve inferiorly. The lymph node packet was removed through the 12 port and multiple specimens.  No significant bleeding was noted.  We then turned our attention to the anastomosis.  In order to create a tension-free anastomosis, the decision made to perform a psoas hitch.  The bladder dome was secured to the left psoas muscle fascia using 3-0 sutures with stitches placed in a longitudinal fashion.  The bladder was then backfilled and distended.  The ureter was able to reach the dome without tension.  Detrusorotomy was performed using monopolar cautery and flaps of detrusor muscle was developed exposing bladder mucosa.  Bladder mucosa was incised sharply.  The ureter was widely spatulated, approximately 1.5 cm.  Mucosa to mucosa anastomosis was then performed using running stitches with 4-0 PDS suture.  Prior to completing the anastomosis, a 6 x 26 double-J ureteral stent was placed. Detrusor muscle was then loosely approximated over the anastomosis using interrupted stitches with 2-0 Vicryl suture.  The anastomosis was tested and again found to be watertight and tension-free.  Pressures were lowered and no active bleeding was noted.  Horacio was applied to the pelvis.  A 15 [place] Islander Weston drain was placed and secured in position using 4-0 nylon suture.  The specimen was extracted through the 12 port and the 12 port fascia was closed  using a Alex-Vilma with 0 Vicryl suture.  The robot was undocked and ports were removed under direct realization.  Skin edges were reapproximated using 4-0 Monocryl suture followed by skin glue.  A solution containing 2000 mg of gemcitabine was instilled into the bladder and the catheter was clamped.  The patient was awoken and transferred to PACU.  All instruments and equipment were accounted for the procedure.    Disposition:  The patient was transferred to PACU in good condition gemcitabine will be drained from the bladder at 10 PM.    Follow-up:  Patient will be admitted to the hospital for observation.  Her Fermin catheter will be maintained 7 to 10 days.  Her stent will be maintained approximately 4 weeks.    Issa Boyd MD  11/7/2024  1:16 PM

## 2024-11-07 NOTE — ANESTHESIA PROCEDURE NOTES
Peripheral IV  Date/Time: 11/7/2024 1:45 PM      Placement  Needle size: 18 G  Laterality: right  Location: hand  Site prep: alcohol  Technique: anatomical landmarks  Attempts: 1

## 2024-11-07 NOTE — ANESTHESIA PROCEDURE NOTES
Arterial Line:    Date/Time: 11/7/2024 1:55 PM    Staffing  Performed: PAULA and attending   Authorized by: Alicia Pittman MD    Performed by: PAULA Kirby    An arterial line was placed. Procedure performed using surface landmarks.in the OR for the following indication(s): continuous blood pressure monitoring and blood sampling needed.    A 20 gauge (size), 5 cm (length), Arrow (type) catheter was placed into the Left radial artery, secured by Tegaderm,   Seldinger technique used.  Events:  patient tolerated procedure well with no complications.

## 2024-11-07 NOTE — ANESTHESIA PREPROCEDURE EVALUATION
Patient: Veronica Keith    Procedure Information       Anesthesia Start Date/Time: 11/07/24 1327    Procedures:       ROBOTIC LEFT DISTAL URETERECTOMY (Left)      Ureteroscopy (Left)      Cystoscopy with Insertion Stent Ureter (Left)      Nephroureterectomy Laparoscopy (Left)    Location: [facility] / [facility]    Surgeons: Issa Boyd MD            Relevant Problems   Anesthesia   (+) PONV (postoperative nausea and vomiting)      /Renal   (+) Acute cystitis with hematuria   (+) Other hydronephrosis       Clinical information reviewed:   Tobacco  Allergies  Meds  Problems  Med Hx  Surg Hx  OB Status    Fam Hx  Soc Hx        NPO Detail:  NPO/Void Status  Carbohydrate Drink Given Prior to Surgery? : N  Date of Last Liquid: 11/07/24  Time of Last Liquid: 0900  Date of Last Solid: 11/06/24  Time of Last Solid: 1800  Last Intake Type: Clear fluids  Time of Last Void: 1045         Physical Exam    Airway  Mallampati: II  TM distance: >3 FB  Neck ROM: full     Cardiovascular - normal exam  Rhythm: regular  Rate: normal     Dental - normal exam     Pulmonary - normal exam  Breath sounds clear to auscultation     Abdominal   (+) obese  Abdomen: soft  Bowel sounds: normal           Anesthesia Plan    History of general anesthesia?: yes  History of complications of general anesthesia?: no    ASA 3     general   (Invasive HD monitoring (A-line))  The patient is not a current smoker.  Patient was previously instructed to abstain from smoking on day of procedure.  Patient did not smoke on day of procedure.  Education provided regarding risk of obstructive sleep apnea.  intravenous induction   Postoperative administration of opioids is intended.  Anesthetic plan and risks discussed with patient and spouse.  Use of blood products discussed with patient and spouse who consented to blood products.    Plan discussed with CAA and CRNA.

## 2024-11-07 NOTE — ANESTHESIA PROCEDURE NOTES
Airway  Date/Time: 11/7/2024 1:36 PM  Urgency: elective    Airway not difficult    Staffing  Performed: PAULA   Authorized by: Alicia Pittman MD    Performed by: PAULA Kirby  Patient location during procedure: OR    Indications and Patient Condition  Indications for airway management: anesthesia  Spontaneous Ventilation: absent  Sedation level: deep  Preoxygenated: yes  Patient position: sniffing  MILS maintained throughout  Mask difficulty assessment: 1 - vent by mask  Planned trial extubation    Final Airway Details  Final airway type: endotracheal airway      Successful airway: ETT  Cuffed: yes   Successful intubation technique: direct laryngoscopy  Facilitating devices/methods: intubating stylet  Endotracheal tube insertion site: oral  Blade: Mady  Blade size: #3  ETT size (mm): 7.0  Cormack-Lehane Classification: grade IIa - partial view of glottis  Placement verified by: chest auscultation   Measured from: lips  ETT to lips (cm): 22  Number of attempts at approach: 1  Ventilation between attempts: supraglottic airway

## 2024-11-08 LAB
ANION GAP SERPL CALC-SCNC: 12 MMOL/L (ref 10–20)
BUN SERPL-MCNC: 20 MG/DL (ref 6–23)
CALCIUM SERPL-MCNC: 8.4 MG/DL (ref 8.6–10.3)
CHLORIDE SERPL-SCNC: 105 MMOL/L (ref 98–107)
CO2 SERPL-SCNC: 25 MMOL/L (ref 21–32)
CREAT FLD-MCNC: 0.96 MG/DL
CREAT SERPL-MCNC: 0.91 MG/DL (ref 0.5–1.05)
EGFRCR SERPLBLD CKD-EPI 2021: 67 ML/MIN/1.73M*2
ERYTHROCYTE [DISTWIDTH] IN BLOOD BY AUTOMATED COUNT: 13.3 % (ref 11.5–14.5)
GLUCOSE SERPL-MCNC: 128 MG/DL (ref 74–99)
HCT VFR BLD AUTO: 36.3 % (ref 36–46)
HGB BLD-MCNC: 11.4 G/DL (ref 12–16)
MCH RBC QN AUTO: 28 PG (ref 26–34)
MCHC RBC AUTO-ENTMCNC: 31.4 G/DL (ref 32–36)
MCV RBC AUTO: 89 FL (ref 80–100)
NRBC BLD-RTO: 0 /100 WBCS (ref 0–0)
PLATELET # BLD AUTO: 210 X10*3/UL (ref 150–450)
POTASSIUM SERPL-SCNC: 4.4 MMOL/L (ref 3.5–5.3)
RBC # BLD AUTO: 4.07 X10*6/UL (ref 4–5.2)
SODIUM SERPL-SCNC: 138 MMOL/L (ref 136–145)
WBC # BLD AUTO: 11.7 X10*3/UL (ref 4.4–11.3)

## 2024-11-08 PROCEDURE — 36415 COLL VENOUS BLD VENIPUNCTURE: CPT | Performed by: UROLOGY

## 2024-11-08 PROCEDURE — 1100000001 HC PRIVATE ROOM DAILY

## 2024-11-08 PROCEDURE — 2500000001 HC RX 250 WO HCPCS SELF ADMINISTERED DRUGS (ALT 637 FOR MEDICARE OP): Performed by: UROLOGY

## 2024-11-08 PROCEDURE — 82570 ASSAY OF URINE CREATININE: CPT | Mod: STJLAB | Performed by: UROLOGY

## 2024-11-08 PROCEDURE — 2500000004 HC RX 250 GENERAL PHARMACY W/ HCPCS (ALT 636 FOR OP/ED): Performed by: UROLOGY

## 2024-11-08 PROCEDURE — 85027 COMPLETE CBC AUTOMATED: CPT | Performed by: UROLOGY

## 2024-11-08 PROCEDURE — 80048 BASIC METABOLIC PNL TOTAL CA: CPT | Performed by: UROLOGY

## 2024-11-08 RX ORDER — CEPHALEXIN 500 MG/1
CAPSULE ORAL
Qty: 9 CAPSULE | Refills: 0 | Status: SHIPPED | OUTPATIENT
Start: 2024-11-08

## 2024-11-08 RX ORDER — IBUPROFEN 600 MG/1
TABLET ORAL
Qty: 30 TABLET | Refills: 1 | Status: SHIPPED | OUTPATIENT
Start: 2024-11-08

## 2024-11-08 RX ORDER — OXYCODONE HYDROCHLORIDE 5 MG/1
5 TABLET ORAL EVERY 6 HOURS PRN
Qty: 12 TABLET | Refills: 0 | Status: SHIPPED | OUTPATIENT
Start: 2024-11-08 | End: 2024-11-13

## 2024-11-08 RX ORDER — ACETAMINOPHEN 325 MG/1
TABLET ORAL
Qty: 30 TABLET | Refills: 1 | Status: SHIPPED | OUTPATIENT
Start: 2024-11-08

## 2024-11-08 RX ADMIN — HEPARIN SODIUM 5000 UNITS: 5000 INJECTION, SOLUTION INTRAVENOUS; SUBCUTANEOUS at 09:22

## 2024-11-08 RX ADMIN — OXYCODONE HYDROCHLORIDE 5 MG: 5 TABLET ORAL at 09:25

## 2024-11-08 RX ADMIN — CALCIUM CARBONATE (ANTACID) CHEW TAB 500 MG 1500 MG: 500 CHEW TAB at 20:34

## 2024-11-08 RX ADMIN — ACETAMINOPHEN 325MG 975 MG: 325 TABLET ORAL at 04:20

## 2024-11-08 RX ADMIN — CEFAZOLIN SODIUM 1 G: 1 INJECTION, SOLUTION INTRAVENOUS at 05:52

## 2024-11-08 RX ADMIN — HEPARIN SODIUM 5000 UNITS: 5000 INJECTION, SOLUTION INTRAVENOUS; SUBCUTANEOUS at 22:31

## 2024-11-08 RX ADMIN — KETOROLAC TROMETHAMINE 15 MG: 30 INJECTION, SOLUTION INTRAMUSCULAR at 05:52

## 2024-11-08 RX ADMIN — ACETAMINOPHEN 325MG 975 MG: 325 TABLET ORAL at 09:23

## 2024-11-08 RX ADMIN — CEFAZOLIN SODIUM 1 G: 1 INJECTION, SOLUTION INTRAVENOUS at 13:38

## 2024-11-08 RX ADMIN — CHOLECALCIFEROL TAB 25 MCG (1000 UNIT) 4000 UNITS: 25 TAB at 09:23

## 2024-11-08 RX ADMIN — Medication 400 MG: at 09:23

## 2024-11-08 RX ADMIN — ATORVASTATIN CALCIUM 20 MG: 20 TABLET, FILM COATED ORAL at 09:22

## 2024-11-08 RX ADMIN — ACETAMINOPHEN 325MG 975 MG: 325 TABLET ORAL at 18:38

## 2024-11-08 RX ADMIN — POLYETHYLENE GLYCOL 3350 17 G: 17 POWDER, FOR SOLUTION ORAL at 09:26

## 2024-11-08 RX ADMIN — KETOROLAC TROMETHAMINE 15 MG: 30 INJECTION, SOLUTION INTRAMUSCULAR at 13:38

## 2024-11-08 RX ADMIN — CALCIUM CARBONATE (ANTACID) CHEW TAB 500 MG 1500 MG: 500 CHEW TAB at 09:23

## 2024-11-08 ASSESSMENT — COGNITIVE AND FUNCTIONAL STATUS - GENERAL
MOBILITY SCORE: 19
DRESSING REGULAR UPPER BODY CLOTHING: A LITTLE
TOILETING: A LITTLE
DRESSING REGULAR LOWER BODY CLOTHING: A LITTLE
CLIMB 3 TO 5 STEPS WITH RAILING: A LOT
WALKING IN HOSPITAL ROOM: A LITTLE
DAILY ACTIVITIY SCORE: 20
STANDING UP FROM CHAIR USING ARMS: A LITTLE
HELP NEEDED FOR BATHING: A LITTLE
MOVING TO AND FROM BED TO CHAIR: A LITTLE

## 2024-11-08 ASSESSMENT — PAIN SCALES - GENERAL
PAINLEVEL_OUTOF10: 3
PAINLEVEL_OUTOF10: 0 - NO PAIN
PAINLEVEL_OUTOF10: 6
PAINLEVEL_OUTOF10: 3
PAINLEVEL_OUTOF10: 2
PAINLEVEL_OUTOF10: 4

## 2024-11-08 ASSESSMENT — PAIN - FUNCTIONAL ASSESSMENT
PAIN_FUNCTIONAL_ASSESSMENT: 0-10

## 2024-11-08 NOTE — DISCHARGE INSTRUCTIONS
1. Please call your physician or go to the emergency department if any of the following occur:  Fever, unrelieved pain, intractable vomiting, bleeding, drainage from wound.  2. Please follow-up in 9 days with Dr. Boyd. Undergo a cystogram in radiology prior to your appointment.  Call for appointments.  3. Take prescribed antibiotic for 3 days beginning the day before your appointment with Dr. Boyd.  5. Do not lift more than 10 pounds for 6 weeks.  6. Patient may not drive while on narcotic pain medications.  7. Patient may shower. Do not scrub incisions.  8. Patient may resume a regular diet.   9. Use ibuprofen 600 mg every 8 hours for 3 days scheduled, then use as needed.   10. Use tylenol 650 mg every 6 hours for 3 days scheduled, then as needed.   11. Use oxycodone as needed as prescribed.   12. You have a waterproof dressing in place over incision. Ok to remove in 3 days. If incision has not closed, cover with a bandage and change as needed.

## 2024-11-08 NOTE — DISCHARGE SUMMARY
Discharge Diagnosis  Malignant neoplasm of left ureter (Multi)    Issues Requiring Follow-Up  Pathology report  Cystogram    Test Results Pending At Discharge  Pending Labs       Order Current Status    Surgical Pathology Exam In process            Hospital Course   Veronica Keith is a 74-year-old female who was found to have left distal ureter high-grade urothelial cell carcinoma.  She underwent left robotic distal ureterectomy with ureteroneocystostomy, psoas hitch, left pelvic lymph node dissection, and stent placement 11/7/2024 without complication.  She was admitted to the hospital for postoperative care.  Her postoperative course was uncomplicated.  Hemoglobin was stable on postoperative day 1.  BRANDON fluid was sent for creatinine and found to be consistent with serum.  The patient tolerated regular diet but did not ambulate until 3 pm. Her pain was well-controlled.  The patient was observed overnight.  With the patient doing well on postoperative day 2, she was discharged home in good condition.  She will follow-up in 9 days for Fermin catheter removal with a cystogram prior.      Home Medications     Medication List      START taking these medications     acetaminophen 325 mg tablet; Commonly known as: Tylenol; 2 tablets   orally every 8 hours for 3 days scheduled, then every 8 hours as needed   for pain. .   cephalexin 500 mg capsule; Commonly known as: Keflex; 1 capsule orally   every 8 hours for 3 days, beginning the day before your follow-up   appointment with Dr. Boyd.   ibuprofen 600 mg tablet; 1 tablet orally every 8 hours for 3 days   scheduled, then every 8 hours as needed for pain.   oxyCODONE 5 mg immediate release tablet; Commonly known as: Roxicodone;   Take 1 tablet (5 mg) by mouth every 6 hours if needed for severe pain (7 -   10) for up to 5 days.     CONTINUE taking these medications     atorvastatin 20 mg tablet; Commonly known as: Lipitor   calcium carbonate 600 mg calcium (1,500 mg) tablet    cholecalciferol 50 MCG (2000 UT) tablet; Commonly known as: Vitamin D-3   famotidine 20 mg tablet; Commonly known as: Pepcid; Take 1 tablet (20   mg) by mouth 2 times a day for 15 days.   magnesium oxide 400 mg tablet; Commonly known as: Mag-Ox       Outpatient Follow-Up  Future Appointments   Date Time Provider Department Center   11/18/2024 10:00 AM [facility]

## 2024-11-08 NOTE — PROGRESS NOTES
11/08/24 0910   Discharge Planning   Living Arrangements Spouse/significant other   Support Systems Spouse/significant other   Type of Residence Private residence   Home or Post Acute Services None   Expected Discharge Disposition Home     Met with patient at bedside. Admitted for ureterectomy, cysto and stent. Pt lives with spouse and was independent PTA with no HHC or DME. Has a PCP provider in [place], but not in [place]. Pt feels she is able to manage her health and understands her medications. Was able to drive and obtain medications. Pt plans to return home with no new discharge needs. Family will provide transport.     1352 Pt is requesting [facility] on discharge. Message sent to Dr Boyd.

## 2024-11-08 NOTE — CARE PLAN
The patient's goals for the shift include  to have good pain control    The clinical goals for the shift include to remain safe    Over the shift, the patient did not make progress toward the following goals. Barriers to progression include patient still with c/o pain . Recommendations to address these barriers include monitor and treat  Problem: Pain - Adult  Goal: Verbalizes/displays adequate comfort level or baseline comfort level  Outcome: Progressing     Problem: Safety - Adult  Goal: Free from fall injury  Outcome: Progressing     Problem: Discharge Planning  Goal: Discharge to home or other facility with appropriate resources  Outcome: Progressing   .

## 2024-11-08 NOTE — PROGRESS NOTES
Urology Progress Note     Subjective:   No acute issues overnight. No nausea, vomiting, fevers, chills, chest pain, or shortness of breath.   The patient is not ambulating.   Pain moderately well-controlled.    Current Diet: Adult diet Regular       Patient Vitals for the past 24 hrs:   BP Temp Temp src Pulse Resp SpO2 Weight   11/08/24 1100 115/54 35.6 °C (96.1 °F) Temporal 72 18 98 % --   11/08/24 0800 120/60 36.8 °C (98.2 °F) Temporal 85 18 97 % --   11/08/24 0400 147/76 35.9 °C (96.6 °F) Temporal 81 18 96 % --   11/08/24 0000 141/62 35.3 °C (95.5 °F) Temporal 78 17 100 % --   11/07/24 2231 -- -- -- -- -- -- 67.6 kg (149 lb 1.6 oz)   11/07/24 2142 167/63 35.2 °C (95.4 °F) Temporal 78 11 97 % --   11/07/24 2115 164/75 36.6 °C (97.9 °F) Temporal 78 12 98 % --   11/07/24 2100 150/66 -- -- 76 (!) 7 97 % --   11/07/24 2045 170/83 -- -- 80 12 99 % --   11/07/24 2030 146/67 -- -- 74 10 100 % --   11/07/24 2015 131/56 -- -- 66 (!) 9 100 % --   11/07/24 2005 126/65 36.5 °C (97.7 °F) -- 67 16 100 % --       Intake/Output Summary (Last 24 hours) at 11/8/2024 1603  Last data filed at 11/8/2024 1200  Gross per 24 hour   Intake 4544.58 ml   Output 3075 ml   Net 1469.58 ml       Physical Exam:   NAD  AOx3  Peripheral pulses palpable  Regular rate  Respirations nonlabored, symmetric chest rise bilaterally  Soft, ND, appropriately tender to palpation  Incisions- clean/dry/intact; no drainage or erythema  Fermin catheter in place draining pink urine  BRANDON serosang  No calf tenderness to palpation bilaterally    Lab results  Results from last 7 days   Lab Units 11/08/24  0549 11/07/24 2029   SODIUM mmol/L 138 140   POTASSIUM mmol/L 4.4 3.9   CHLORIDE mmol/L 105 106   CO2 mmol/L 25 25   BUN mg/dL 20 20   CREATININE mg/dL 0.91 1.01   GLUCOSE mg/dL 128* 141*   CALCIUM mg/dL 8.4* 8.1*     Results from last 7 days   Lab Units 11/08/24  0549 11/07/24 2029   WBC AUTO x10*3/uL 11.7* 12.0*   HEMOGLOBIN g/dL 11.4* 11.7*   HEMATOCRIT % 36.3  37.1   PLATELETS AUTO x10*3/uL 210 176       Lab Results   Component Value Date    APPEARANCEU Clear 10/23/2024    COLORU Light-Yellow 10/23/2024    KETONESU NEGATIVE 10/23/2024    PROTUR NEGATIVE 10/23/2024    SPECGRAVU 1.019 10/23/2024    UROBILINOGEN Normal 10/23/2024    WBCU 1-5 10/23/2024       Interval Imaging Findings:    Impression:    74-year-old female status post robotic left distal ureterectomy, ureteroneocystostomy, psoas stitch, pelvic lymph node dissection, stent placement, ureteroscopy for left distal ureter-year-old female for carcinoma 11/7/2024    Plan:   Patient doing well.  Advance to regular diet.  Discontinue IV fluids.  Hemoglobin stable.  Encourage ambulation.  BRANDON fluid consistent with serum creatinine.     Issa Boyd MD  11/8/2024  4:03 PM  Pager#: [phone]

## 2024-11-08 NOTE — ANESTHESIA POSTPROCEDURE EVALUATION
Patient: Veronica Keith    Procedure Summary       Date: 11/07/24 Room / Location: [facility] / [facility]    Anesthesia Start: 1327 Anesthesia Stop:     Procedures:       ROBOTIC LEFT DISTAL URETERECTOMY (Left)      Ureteroscopy (Left)      Cystoscopy with Insertion Stent Ureter (Left) Diagnosis:       Malignant neoplasm of left ureter (Multi)      (Malignant neoplasm of left ureter (Multi) [C66.2])    Surgeons: Issa Boyd MD Responsible Provider: Alicia Pittman MD    Anesthesia Type: general ASA Status: 3            Anesthesia Type: general    Vitals Value Taken Time   /65 11/07/24 2007   Temp 36.5 11/07/24 2007   Pulse 69 11/07/24 2007   Resp 9 11/07/24 2007   SpO2 100 11/07/24 2007       Anesthesia Post Evaluation    Patient location during evaluation: PACU  Patient participation: complete - patient cannot participate  Level of consciousness: responsive to painful stimuli  Pain score: 0  Pain management: adequate  Multimodal analgesia pain management approach  Airway patency: patent  Two or more strategies used to mitigate risk of obstructive sleep apnea  Cardiovascular status: acceptable and stable  Respiratory status: acceptable, face mask and oral airway  Hydration status: acceptable  Postoperative Nausea and Vomiting: none        No notable events documented.

## 2024-11-09 VITALS
RESPIRATION RATE: 18 BRPM | DIASTOLIC BLOOD PRESSURE: 64 MMHG | OXYGEN SATURATION: 100 % | SYSTOLIC BLOOD PRESSURE: 111 MMHG | BODY MASS INDEX: 23.96 KG/M2 | HEIGHT: 66 IN | HEART RATE: 67 BPM | TEMPERATURE: 97.5 F | WEIGHT: 149.1 LBS

## 2024-11-09 LAB
BLOOD EXPIRATION DATE: NORMAL
DISPENSE STATUS: NORMAL
PRODUCT BLOOD TYPE: 5100
PRODUCT CODE: NORMAL
UNIT ABO: NORMAL
UNIT NUMBER: NORMAL
UNIT RH: NORMAL
UNIT VOLUME: 350
XM INTEP: NORMAL

## 2024-11-09 PROCEDURE — 2500000001 HC RX 250 WO HCPCS SELF ADMINISTERED DRUGS (ALT 637 FOR MEDICARE OP): Performed by: UROLOGY

## 2024-11-09 PROCEDURE — 2500000004 HC RX 250 GENERAL PHARMACY W/ HCPCS (ALT 636 FOR OP/ED): Performed by: UROLOGY

## 2024-11-09 RX ADMIN — CALCIUM CARBONATE (ANTACID) CHEW TAB 500 MG 1500 MG: 500 CHEW TAB at 08:55

## 2024-11-09 RX ADMIN — POLYETHYLENE GLYCOL 3350 17 G: 17 POWDER, FOR SOLUTION ORAL at 08:56

## 2024-11-09 RX ADMIN — ACETAMINOPHEN 325MG 975 MG: 325 TABLET ORAL at 06:08

## 2024-11-09 RX ADMIN — CHOLECALCIFEROL TAB 25 MCG (1000 UNIT) 4000 UNITS: 25 TAB at 08:56

## 2024-11-09 RX ADMIN — ATORVASTATIN CALCIUM 20 MG: 20 TABLET, FILM COATED ORAL at 08:55

## 2024-11-09 RX ADMIN — Medication 400 MG: at 08:55

## 2024-11-09 ASSESSMENT — COGNITIVE AND FUNCTIONAL STATUS - GENERAL
WALKING IN HOSPITAL ROOM: A LITTLE
DAILY ACTIVITIY SCORE: 20
DRESSING REGULAR UPPER BODY CLOTHING: A LITTLE
STANDING UP FROM CHAIR USING ARMS: A LITTLE
DRESSING REGULAR LOWER BODY CLOTHING: A LITTLE
MOBILITY SCORE: 20
TOILETING: A LITTLE
HELP NEEDED FOR BATHING: A LITTLE
CLIMB 3 TO 5 STEPS WITH RAILING: A LOT

## 2024-11-09 ASSESSMENT — PAIN SCALES - GENERAL
PAINLEVEL_OUTOF10: 2

## 2024-11-09 ASSESSMENT — PAIN - FUNCTIONAL ASSESSMENT
PAIN_FUNCTIONAL_ASSESSMENT: 0-10

## 2024-11-09 NOTE — CARE PLAN
The clinical goals for the shift include no pain.    Goal met. Pain was controlled with pain meds. Pt. Walked in reyes. Raghav drain pulled. Pt. To be discharge home tomorrow with reba.

## 2024-11-09 NOTE — PROGRESS NOTES
"Veronica Keith is a 74 y.o. female on day 2 of admission presenting with Malignant neoplasm of left ureter (Multi).    Subjective   Feeling good       Objective     Physical Exam    Fermin--clear yellow  Abd--s/nt/nt    Last Recorded Vitals  Blood pressure 117/67, pulse 70, temperature 36.1 °C (97 °F), temperature source Temporal, resp. rate 16, height 1.664 m (5' 5.51\"), weight 67.6 kg (149 lb 1.6 oz), SpO2 97%.  Intake/Output last 3 Shifts:  I/O last 3 completed shifts:  In: 4944.6 (73.1 mL/kg) [P.O.:1700; I.V.:1144.6 (16.9 mL/kg); IV Piggyback:2100]  Out: 4285 (63.4 mL/kg) [Urine:4125 (1.7 mL/kg/hr); Drains:160]  Weight: 67.6 kg     Relevant Results                              Assessment/Plan   Assessment & Plan  Malignant neoplasm of left ureter (Multi)    Pt looks good  Dc home  Fu insructions already provided to pt by dr rebecca Swann MD      "

## 2024-11-09 NOTE — CARE PLAN
The patient's goals for the shift include  Patient will have good pain control    The clinical goals for the shift include to remain safe    Over the shift, the patient did not make progress toward the following goals. Barriers to progression include Patient still having some pain with movement. Recommendations to address these barriers include to monitor and treat  Problem: Pain - Adult  Goal: Verbalizes/displays adequate comfort level or baseline comfort level  Outcome: Progressing     Problem: Skin  Goal: Decreased wound size/increased tissue granulation at next dressing change  Outcome: Progressing   .

## 2024-11-18 ENCOUNTER — HOSPITAL ENCOUNTER (OUTPATIENT)
Dept: RADIOLOGY | Facility: HOSPITAL | Age: 74
Discharge: HOME | End: 2024-11-18
Payer: MEDICARE

## 2024-11-18 DIAGNOSIS — C66.2 MALIGNANT NEOPLASM OF LEFT URETER (MULTI): ICD-10-CM

## 2024-11-18 PROCEDURE — 2550000001 HC RX 255 CONTRASTS: Performed by: UROLOGY

## 2024-11-18 PROCEDURE — 74430 CONTRAST X-RAY BLADDER: CPT

## 2024-11-19 LAB
LABORATORY COMMENT REPORT: NORMAL
PATH REPORT.FINAL DX SPEC: NORMAL
PATH REPORT.GROSS SPEC: NORMAL
PATH REPORT.RELEVANT HX SPEC: NORMAL
PATH REPORT.TOTAL CANCER: NORMAL
PATHOLOGY SYNOPTIC REPORT: NORMAL
RESIDENT REVIEW: NORMAL

## (undated) DEVICE — SUTURE, MONOCRYL, 4-0, 27 IN, PS-2, UNDYED

## (undated) DEVICE — STRIP, SKIN CLOSURE, STERI STRIP, REINFORCED, 0.5 X 4 IN

## (undated) DEVICE — SOLUTION, IRRIGATION, SODIUM CHLORIDE 0.9%, 1000 ML, POUR BOTTLE

## (undated) DEVICE — Device

## (undated) DEVICE — SCISSORS, METZ, CURVED, 3/4 BLADE

## (undated) DEVICE — SUTURE, PDSII, 1, TP-1, VIL, MONO, 48LP

## (undated) DEVICE — DRAIN, JP CHANNEL, 15 FR, RND, W/TROCAR

## (undated) DEVICE — SEAL, UNIVERSAL, 5-12MM

## (undated) DEVICE — ASSEMBLY, STRYKER FLOW 2, SUCTION IRRIGATOR, WITH TIP

## (undated) DEVICE — COVER, TIP HOT SHEARS ENDOWRIST

## (undated) DEVICE — IRRIGATION KIT, PUMPING, SINGLE ACTION, SYRINGE, 10 CC

## (undated) DEVICE — TOWEL PACK, STERILE, 4/PACK, BLUE

## (undated) DEVICE — CARE KIT, LAPAROSCOPIC, ADVANCED

## (undated) DEVICE — CLIP, LIGATING, HEM-O-LOCK, MLX, LARGE, LF, PURPLE

## (undated) DEVICE — GLOVE, SURGICAL, PROTEXIS PI MICRO, 7.0, PF, LF

## (undated) DEVICE — Y-ADAPTER, GATEWAY ADVANTAGE

## (undated) DEVICE — PLUG, CATHETER

## (undated) DEVICE — DRIVER, NEEDLE LARGE, DAVINCI XI

## (undated) DEVICE — OBTURATOR, BLADELESS , SU

## (undated) DEVICE — GUIDEWIRE, ZIPWIRE, STANDARD, 0.018 X 150CM, STRAIGHT TIP

## (undated) DEVICE — HOLMIUM MP 365 FORTEC FIBER

## (undated) DEVICE — SCOPE, LITHOVUE SUD ONLY, GLOBAL STANDARD

## (undated) DEVICE — CATHETER, DUAL LUMEN, UDC/10/50 M

## (undated) DEVICE — SUTURE, ETHILON, 2-0, FSLX 30, BLACK

## (undated) DEVICE — DRAPE, SHEET, ENDOSCOPY, GENERAL, FENESTRATED, ARMBOARD COVER, 98 X 123.5 IN, DISPOSABLE, LF, STERILE

## (undated) DEVICE — ELECTRODE, HF, ESG PLASMA LOOP-MEDIUM 30 DEG

## (undated) DEVICE — GUIDEWIRE, SENSOR .038 3CM FLEX TIP, STRAIGHT 150CML

## (undated) DEVICE — SPONGE, HEMOSTATIC, CELLULOSE, SURGICEL, 2 X 14 IN

## (undated) DEVICE — ADHESIVE, SKIN, MASTISOL, 2/3 CC VIAL

## (undated) DEVICE — SUTURE, VICRYL, 18 IN, CT-1, UNDYED

## (undated) DEVICE — SOLUTION, IRRIGATION, STERILE WATER, 1000 ML, POUR BOTTLE

## (undated) DEVICE — SCISSOR, CLASSIC PLUS, MAYO, 9IN, CRVD

## (undated) DEVICE — HEMOSTAT, ARISTA, ABSORBABLE, 1 GRAMS

## (undated) DEVICE — GOWN, ASTOUND, XL

## (undated) DEVICE — BASKET, STONE, RETRIEVAL, NITINOL (4WIRE, 1.9 0 TIP)

## (undated) DEVICE — POSITIONING, THE PINK PAD, PIGAZZI SYSTEM

## (undated) DEVICE — DRAPE PACK, LAVH, W/ATTACHED LEGGINGS, W/POUCH, 100 X 114 IN, LF, STERILE

## (undated) DEVICE — DRAPE, ARM XI

## (undated) DEVICE — LOOP, VESSEL, MAXI, RED

## (undated) DEVICE — SUTURE, V-LOC 90, 3-0, CV-20, VIOLET

## (undated) DEVICE — DRAPE, COLUMN, DAVINCI XI

## (undated) DEVICE — EVACUATOR, WOUND, SUCTION, CLOSED, JACKSON-PRATT, 100 CC, SILICONE

## (undated) DEVICE — TROCAR, KII OPTICAL BLADELESS 5MM Z THREAD 100MM LNGTH

## (undated) DEVICE — APPLICATOR, CHLORAPREP, W/ORANGE TINT, 26ML

## (undated) DEVICE — NEEDLE, INSUFFLATION, 13GAX120MM, DISP

## (undated) DEVICE — IRRIGATION SET, CYSTOSCOPY, REGULATING CLAMP, STRAIGHT, 81 IN

## (undated) DEVICE — RETRIEVAL SYSTEM, MONARCH, 10MM DISP ENDOSCOPIC

## (undated) DEVICE — ACCESS PORT, 12MM, 100MM LENGTH, LOW PROFILE W/BLADELESS OPTICAL TIP

## (undated) DEVICE — FORCEPS, PROGRASP, DAVINCI XI

## (undated) DEVICE — SUTURE, V-LOC 3-0 V-20 6 GR 180 ABS"

## (undated) DEVICE — TUBING SET, TRI-LUMEN, FILTERED, F/AIRSEAL

## (undated) DEVICE — DEVICE, OMNICLOSE, 10MM

## (undated) DEVICE — SCISSORS, ROUND TIP, DAVINCI XI

## (undated) DEVICE — SHEATH, URETERAL ACCESS, NAVIGATOR 11/13F X 28CM